# Patient Record
Sex: MALE | Race: WHITE | NOT HISPANIC OR LATINO | Employment: FULL TIME | URBAN - METROPOLITAN AREA
[De-identification: names, ages, dates, MRNs, and addresses within clinical notes are randomized per-mention and may not be internally consistent; named-entity substitution may affect disease eponyms.]

---

## 2017-08-07 ENCOUNTER — OFFICE VISIT (OUTPATIENT)
Dept: FAMILY MEDICINE | Facility: CLINIC | Age: 27
End: 2017-08-07
Payer: COMMERCIAL

## 2017-08-07 VITALS
TEMPERATURE: 98 F | WEIGHT: 184.63 LBS | HEIGHT: 68 IN | DIASTOLIC BLOOD PRESSURE: 80 MMHG | BODY MASS INDEX: 27.98 KG/M2 | SYSTOLIC BLOOD PRESSURE: 120 MMHG

## 2017-08-07 DIAGNOSIS — F90.0 ADHD, PREDOMINANTLY INATTENTIVE TYPE: Primary | ICD-10-CM

## 2017-08-07 DIAGNOSIS — Z87.442 HISTORY OF KIDNEY STONES: ICD-10-CM

## 2017-08-07 DIAGNOSIS — G47.00 INSOMNIA, UNSPECIFIED TYPE: ICD-10-CM

## 2017-08-07 PROCEDURE — 99213 OFFICE O/P EST LOW 20 MIN: CPT | Mod: S$GLB,,, | Performed by: FAMILY MEDICINE

## 2017-08-07 PROCEDURE — 3008F BODY MASS INDEX DOCD: CPT | Mod: S$GLB,,, | Performed by: FAMILY MEDICINE

## 2017-08-07 PROCEDURE — 99999 PR PBB SHADOW E&M-EST. PATIENT-LVL III: CPT | Mod: PBBFAC,,, | Performed by: FAMILY MEDICINE

## 2017-08-07 RX ORDER — LISDEXAMFETAMINE DIMESYLATE CAPSULES 20 MG/1
20 CAPSULE ORAL EVERY MORNING
Qty: 30 CAPSULE | Refills: 0 | Status: SHIPPED | OUTPATIENT
Start: 2017-08-07 | End: 2017-08-07 | Stop reason: SDUPTHER

## 2017-08-07 RX ORDER — LISDEXAMFETAMINE DIMESYLATE CAPSULES 20 MG/1
20 CAPSULE ORAL EVERY MORNING
Qty: 30 CAPSULE | Refills: 0 | Status: SHIPPED | OUTPATIENT
Start: 2017-09-07 | End: 2017-08-07 | Stop reason: SDUPTHER

## 2017-08-07 RX ORDER — LISDEXAMFETAMINE DIMESYLATE CAPSULES 20 MG/1
20 CAPSULE ORAL EVERY MORNING
Qty: 30 CAPSULE | Refills: 0 | Status: SHIPPED | OUTPATIENT
Start: 2017-10-07 | End: 2017-11-29 | Stop reason: SDUPTHER

## 2017-08-07 NOTE — PATIENT INSTRUCTIONS
Bon for insomnia.  Look for CBT-I which stands for Cognitive Behavior Therapy for Insomnia.  It was developed by the [facility]. You may find it helpful.

## 2017-08-07 NOTE — PROGRESS NOTES
"Subjective:       Patient ID: Jamshid Michaels is a 27 y.o. male.    Chief Complaint: ADHD (Would like to restart Vyvanse. Pt has not taken since Dec.)    I reviewed previous notes.  He has a history of ADHD.  Poor task completion.  More impulsive.  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  He now has a job and now has insurance and the insurance will help him pay for the Vyvanse.  No significant side effects.  He does have chronic insomnia even without the Vyvanse.      Review of Systems   Constitutional: Negative for appetite change and unexpected weight change.   Respiratory: Negative for shortness of breath.    Cardiovascular: Negative for chest pain and palpitations.   Genitourinary:        Recent history of right kidney stone   Psychiatric/Behavioral: Positive for sleep disturbance. Negative for agitation.       Objective:     Blood pressure 120/80, temperature 98 °F (36.7 °C), temperature source Oral, height 5' 8" (1.727 m), weight 83.7 kg (184 lb 10.2 oz).      Physical Exam   Constitutional: He appears well-developed and well-nourished. No distress.   Cardiovascular: Normal rate, regular rhythm and normal heart sounds.    Pulmonary/Chest: Effort normal and breath sounds normal.   Neurological: He is alert.   He does fidget and taps his feet a lot   Psychiatric: He has a normal mood and affect.       Assessment:       1. ADHD, predominantly inattentive type    2. Insomnia, unspecified type    3. History of kidney stones        Plan:       We discussed healthy lifestyle.  I referred him to the insomnia application for [employer].  I refilled 3 months of Vyvanse.     "

## 2017-08-08 ENCOUNTER — PATIENT MESSAGE (OUTPATIENT)
Dept: FAMILY MEDICINE | Facility: CLINIC | Age: 27
End: 2017-08-08

## 2017-08-09 ENCOUNTER — TELEPHONE (OUTPATIENT)
Dept: FAMILY MEDICINE | Facility: CLINIC | Age: 27
End: 2017-08-09

## 2017-08-09 NOTE — TELEPHONE ENCOUNTER
Rec'd CATY evans via fax from [facility](fax [phone]) for Vyvanse 20mg daily.    [phone]  Pt ID# [id number]    Called and spoke w/ Edwin at [facility]. Rx approved until 8/9/18.   Auth # [id number]

## 2017-11-14 ENCOUNTER — PATIENT OUTREACH (OUTPATIENT)
Dept: ADMINISTRATIVE | Facility: HOSPITAL | Age: 27
End: 2017-11-14

## 2017-11-14 NOTE — LETTER
November 27, 2017    Jamshid Michaels  [address] [place] [address]             [facility]  [address]  [place] [address]  Phone: [phone] Dear Mr. Michaels:    We have tried to reach you by mychart unsuccessfully.    [facility] is committed to your overall health.  To help you get the most out of each of your visits, we will review your information to make sure you are up to date on all of your recommended tests and/or procedures.       Dr. Abebe Lange has found that your chart shows you may be due for tetanus and flu immunizations.     If you have had any of the above done at another facility, please bring the records or information with you so that your record at [facility] will be complete.  If you would like to schedule any of these, please contact me.     If you are currently taking medication, please bring it with you to your appointment for review.      If you have any questions or concerns, please don't hesitate to call.    Thank you for letting us care for you,  Noemi Sheridan LPN Clinical Care Coordinator  [facility] [place]  [phone]

## 2017-11-29 ENCOUNTER — OFFICE VISIT (OUTPATIENT)
Dept: FAMILY MEDICINE | Facility: CLINIC | Age: 27
End: 2017-11-29
Payer: COMMERCIAL

## 2017-11-29 VITALS
BODY MASS INDEX: 28.28 KG/M2 | DIASTOLIC BLOOD PRESSURE: 98 MMHG | WEIGHT: 186.63 LBS | TEMPERATURE: 98 F | HEIGHT: 68 IN | SYSTOLIC BLOOD PRESSURE: 130 MMHG

## 2017-11-29 DIAGNOSIS — R03.0 SINGLE EPISODE OF ELEVATED BLOOD PRESSURE: ICD-10-CM

## 2017-11-29 DIAGNOSIS — F90.0 ADHD, PREDOMINANTLY INATTENTIVE TYPE: Primary | ICD-10-CM

## 2017-11-29 PROCEDURE — 99999 PR PBB SHADOW E&M-EST. PATIENT-LVL III: CPT | Mod: PBBFAC,,, | Performed by: FAMILY MEDICINE

## 2017-11-29 PROCEDURE — 99214 OFFICE O/P EST MOD 30 MIN: CPT | Mod: S$GLB,,, | Performed by: FAMILY MEDICINE

## 2017-11-29 RX ORDER — LISDEXAMFETAMINE DIMESYLATE CAPSULES 20 MG/1
20 CAPSULE ORAL EVERY MORNING
Qty: 30 CAPSULE | Refills: 0 | Status: SHIPPED | OUTPATIENT
Start: 2017-12-29 | End: 2017-11-29 | Stop reason: SDUPTHER

## 2017-11-29 RX ORDER — LISDEXAMFETAMINE DIMESYLATE CAPSULES 20 MG/1
20 CAPSULE ORAL EVERY MORNING
Qty: 30 CAPSULE | Refills: 0 | Status: SHIPPED | OUTPATIENT
Start: 2017-11-29 | End: 2017-11-29 | Stop reason: SDUPTHER

## 2017-11-29 RX ORDER — LISDEXAMFETAMINE DIMESYLATE CAPSULES 20 MG/1
20 CAPSULE ORAL EVERY MORNING
Qty: 30 CAPSULE | Refills: 0 | Status: SHIPPED | OUTPATIENT
Start: 2018-01-29 | End: 2018-03-12 | Stop reason: SDUPTHER

## 2017-11-29 NOTE — PROGRESS NOTES
"Subjective:       Patient ID: Jamshid Michaels is a 27 y.o. male.    Chief Complaint: Follow-up (3 mo med f/u. Needs flu shot)    FU ADD.  Vyvanse 20 mg lasts until 2PM which is adequate for him. He is comfortable with that dose. Better task completion and not jumping from topic to topic. Has deadline this week and stayed up late.  That may have elevated his BP. No hx of HTN but father does. Moderate alcohol this past weekend.       Review of Systems   Constitutional: Negative for fever and unexpected weight change.   Respiratory: Negative for cough and shortness of breath.    Cardiovascular: Negative for chest pain, palpitations and leg swelling.   Psychiatric/Behavioral: Negative for behavioral problems and sleep disturbance.       Objective:     Blood pressure (!) 130/98, temperature 97.9 °F (36.6 °C), temperature source Oral, height 5' 8" (1.727 m), weight 84.6 kg (186 lb 9.9 oz).      Physical Exam   Constitutional: He appears well-developed and well-nourished. No distress.   Cardiovascular: Normal rate, regular rhythm and normal heart sounds.    No murmur heard.  Pulmonary/Chest: Effort normal and breath sounds normal.   Neurological: He is alert.   Psychiatric: He has a normal mood and affect.       Assessment:       1. ADHD, predominantly inattentive type    2. Single episode of elevated blood pressure        Plan:       Monitor BP and bring log next time. Refilled Vyvanse for 3 months.      "

## 2018-03-12 ENCOUNTER — OFFICE VISIT (OUTPATIENT)
Dept: FAMILY MEDICINE | Facility: CLINIC | Age: 28
End: 2018-03-12
Payer: COMMERCIAL

## 2018-03-12 VITALS
WEIGHT: 184.75 LBS | TEMPERATURE: 98 F | DIASTOLIC BLOOD PRESSURE: 80 MMHG | HEART RATE: 60 BPM | BODY MASS INDEX: 28 KG/M2 | SYSTOLIC BLOOD PRESSURE: 110 MMHG | HEIGHT: 68 IN

## 2018-03-12 DIAGNOSIS — F90.0 ADHD, PREDOMINANTLY INATTENTIVE TYPE: Primary | ICD-10-CM

## 2018-03-12 PROCEDURE — 99999 PR PBB SHADOW E&M-EST. PATIENT-LVL III: CPT | Mod: PBBFAC,,, | Performed by: FAMILY MEDICINE

## 2018-03-12 PROCEDURE — 99213 OFFICE O/P EST LOW 20 MIN: CPT | Mod: S$GLB,,, | Performed by: FAMILY MEDICINE

## 2018-03-12 RX ORDER — LISDEXAMFETAMINE DIMESYLATE CAPSULES 20 MG/1
20 CAPSULE ORAL EVERY MORNING
Qty: 30 CAPSULE | Refills: 0 | Status: SHIPPED | OUTPATIENT
Start: 2018-03-12 | End: 2018-03-12 | Stop reason: SDUPTHER

## 2018-03-12 RX ORDER — LISDEXAMFETAMINE DIMESYLATE CAPSULES 20 MG/1
20 CAPSULE ORAL EVERY MORNING
Qty: 30 CAPSULE | Refills: 0 | Status: SHIPPED | OUTPATIENT
Start: 2018-05-12 | End: 2018-05-17 | Stop reason: SDUPTHER

## 2018-03-12 RX ORDER — LISDEXAMFETAMINE DIMESYLATE CAPSULES 20 MG/1
20 CAPSULE ORAL EVERY MORNING
Qty: 30 CAPSULE | Refills: 0 | Status: SHIPPED | OUTPATIENT
Start: 2018-04-12 | End: 2018-03-12 | Stop reason: SDUPTHER

## 2018-03-12 NOTE — PROGRESS NOTES
"Subjective:       Patient ID: Jamshid Michaels is a 27 y.o. male.    Chief Complaint: Follow-up    ADD. Functioning very well at work with Vyvanse 20. Good task completion and focus. Previously elev BP. All others ok.       Review of Systems   Constitutional: Negative for fatigue, fever and unexpected weight change.   Cardiovascular: Negative for chest pain and palpitations.   Psychiatric/Behavioral: Negative for confusion, decreased concentration and sleep disturbance.       Objective:     Blood pressure 110/80, pulse 60, temperature 97.8 °F (36.6 °C), height 5' 8" (1.727 m), weight 83.8 kg (184 lb 11.9 oz).      Physical Exam   Constitutional: He appears well-developed and well-nourished. No distress.   Cardiovascular: Normal rate, regular rhythm, normal heart sounds and intact distal pulses.    Pulmonary/Chest: Effort normal. No respiratory distress.   Musculoskeletal: He exhibits no edema.   Neurological: He is alert.   Psychiatric: He has a normal mood and affect.       Assessment:       1. ADHD, predominantly inattentive type        Plan:       3 months vyvanse sent to pharmacy     "

## 2018-05-17 ENCOUNTER — OFFICE VISIT (OUTPATIENT)
Dept: FAMILY MEDICINE | Facility: CLINIC | Age: 28
End: 2018-05-17
Payer: COMMERCIAL

## 2018-05-17 VITALS
HEIGHT: 68 IN | BODY MASS INDEX: 28.15 KG/M2 | WEIGHT: 185.75 LBS | SYSTOLIC BLOOD PRESSURE: 110 MMHG | HEART RATE: 62 BPM | DIASTOLIC BLOOD PRESSURE: 80 MMHG

## 2018-05-17 DIAGNOSIS — F90.0 ADHD, PREDOMINANTLY INATTENTIVE TYPE: Primary | ICD-10-CM

## 2018-05-17 PROCEDURE — 99999 PR PBB SHADOW E&M-EST. PATIENT-LVL III: CPT | Mod: PBBFAC,,, | Performed by: FAMILY MEDICINE

## 2018-05-17 PROCEDURE — 3008F BODY MASS INDEX DOCD: CPT | Mod: CPTII,S$GLB,, | Performed by: FAMILY MEDICINE

## 2018-05-17 PROCEDURE — 99213 OFFICE O/P EST LOW 20 MIN: CPT | Mod: S$GLB,,, | Performed by: FAMILY MEDICINE

## 2018-05-17 RX ORDER — LISDEXAMFETAMINE DIMESYLATE CAPSULES 20 MG/1
20 CAPSULE ORAL EVERY MORNING
Qty: 30 CAPSULE | Refills: 0 | Status: SHIPPED | OUTPATIENT
Start: 2018-08-11 | End: 2018-09-06 | Stop reason: SDUPTHER

## 2018-05-17 RX ORDER — LISDEXAMFETAMINE DIMESYLATE CAPSULES 20 MG/1
20 CAPSULE ORAL EVERY MORNING
Qty: 30 CAPSULE | Refills: 0 | Status: SHIPPED | OUTPATIENT
Start: 2018-07-11 | End: 2018-05-17 | Stop reason: SDUPTHER

## 2018-05-17 RX ORDER — LISDEXAMFETAMINE DIMESYLATE CAPSULES 20 MG/1
20 CAPSULE ORAL EVERY MORNING
Qty: 30 CAPSULE | Refills: 0 | Status: SHIPPED | OUTPATIENT
Start: 2018-06-11 | End: 2018-05-17 | Stop reason: SDUPTHER

## 2018-05-17 NOTE — PROGRESS NOTES
"Subjective:       Patient ID: Jamshid Michaels is a 28 y.o. male.    Chief Complaint: Follow-up (Med f/u)    FU ADD.  He works as a  and is able to complete tasks and stay focused on Vyvanse. No side effects. The pharmacy was having trouble finding his May Rx. We will call them      Review of Systems   Constitutional: Negative for appetite change and unexpected weight change.   Respiratory: Negative for shortness of breath.    Cardiovascular: Negative for chest pain and palpitations.        Elevated bp last yr was due to workout supplement that contained stimulants.    Psychiatric/Behavioral: Negative for sleep disturbance.       Objective:     Blood pressure 110/80, pulse 62, height 5' 8" (1.727 m), weight 84.2 kg (185 lb 11.8 oz).      Physical Exam   Constitutional: He appears well-developed and well-nourished. No distress.   Cardiovascular: Normal rate and regular rhythm.    Pulmonary/Chest: Effort normal and breath sounds normal.   Neurological: He is alert.       Assessment:       1. ADHD, predominantly inattentive type        Plan:       Vyvanse 20 qd. June July and August     "

## 2018-06-14 ENCOUNTER — OFFICE VISIT (OUTPATIENT)
Dept: INTERNAL MEDICINE | Facility: CLINIC | Age: 28
End: 2018-06-14
Payer: COMMERCIAL

## 2018-06-14 ENCOUNTER — LAB VISIT (OUTPATIENT)
Dept: LAB | Facility: HOSPITAL | Age: 28
End: 2018-06-14
Payer: COMMERCIAL

## 2018-06-14 VITALS
WEIGHT: 177.69 LBS | BODY MASS INDEX: 26.32 KG/M2 | SYSTOLIC BLOOD PRESSURE: 110 MMHG | OXYGEN SATURATION: 97 % | HEIGHT: 69 IN | DIASTOLIC BLOOD PRESSURE: 78 MMHG | TEMPERATURE: 98 F | HEART RATE: 106 BPM

## 2018-06-14 DIAGNOSIS — R19.7 DIARRHEA, UNSPECIFIED TYPE: ICD-10-CM

## 2018-06-14 DIAGNOSIS — E86.0 DEHYDRATION: ICD-10-CM

## 2018-06-14 DIAGNOSIS — R19.7 DIARRHEA, UNSPECIFIED TYPE: Primary | ICD-10-CM

## 2018-06-14 LAB
ALBUMIN SERPL BCP-MCNC: 3.7 G/DL
ALP SERPL-CCNC: 69 U/L
ALT SERPL W/O P-5'-P-CCNC: 23 U/L
AMYLASE SERPL-CCNC: 53 U/L
ANION GAP SERPL CALC-SCNC: 10 MMOL/L
AST SERPL-CCNC: 20 U/L
BASOPHILS # BLD AUTO: 0.02 K/UL
BASOPHILS NFR BLD: 0.2 %
BILIRUB SERPL-MCNC: 0.6 MG/DL
BILIRUB SERPL-MCNC: ABNORMAL MG/DL
BLOOD URINE, POC: ABNORMAL
BUN SERPL-MCNC: 11 MG/DL
CALCIUM SERPL-MCNC: 10 MG/DL
CHLORIDE SERPL-SCNC: 104 MMOL/L
CO2 SERPL-SCNC: 26 MMOL/L
COLOR, POC UA: YELLOW
CREAT SERPL-MCNC: 1.2 MG/DL
DIFFERENTIAL METHOD: ABNORMAL
EOSINOPHIL # BLD AUTO: 0.1 K/UL
EOSINOPHIL NFR BLD: 0.6 %
ERYTHROCYTE [DISTWIDTH] IN BLOOD BY AUTOMATED COUNT: 13.2 %
EST. GFR  (AFRICAN AMERICAN): >60 ML/MIN/1.73 M^2
EST. GFR  (NON AFRICAN AMERICAN): >60 ML/MIN/1.73 M^2
GLUCOSE SERPL-MCNC: 107 MG/DL
GLUCOSE UR QL STRIP: NORMAL
HCT VFR BLD AUTO: 43 %
HGB BLD-MCNC: 14.8 G/DL
KETONES UR QL STRIP: ABNORMAL
LEUKOCYTE ESTERASE URINE, POC: ABNORMAL
LIPASE SERPL-CCNC: 68 U/L
LYMPHOCYTES # BLD AUTO: 1.1 K/UL
LYMPHOCYTES NFR BLD: 13.3 %
MCH RBC QN AUTO: 32.2 PG
MCHC RBC AUTO-ENTMCNC: 34.4 G/DL
MCV RBC AUTO: 94 FL
MONOCYTES # BLD AUTO: 1.7 K/UL
MONOCYTES NFR BLD: 19.9 %
NEUTROPHILS # BLD AUTO: 5.5 K/UL
NEUTROPHILS NFR BLD: 65.8 %
NITRITE, POC UA: ABNORMAL
PH, POC UA: 5
PLATELET # BLD AUTO: 289 K/UL
PMV BLD AUTO: 9.4 FL
POTASSIUM SERPL-SCNC: 3.9 MMOL/L
PROT SERPL-MCNC: 7.9 G/DL
PROTEIN, POC: 30
RBC # BLD AUTO: 4.59 M/UL
SODIUM SERPL-SCNC: 140 MMOL/L
SPECIFIC GRAVITY, POC UA: 1.02
UROBILINOGEN, POC UA: NORMAL
WBC # BLD AUTO: 8.29 K/UL

## 2018-06-14 PROCEDURE — 3008F BODY MASS INDEX DOCD: CPT | Mod: CPTII,S$GLB,, | Performed by: INTERNAL MEDICINE

## 2018-06-14 PROCEDURE — S0119 ONDANSETRON 4 MG: HCPCS | Mod: S$GLB,,, | Performed by: INTERNAL MEDICINE

## 2018-06-14 PROCEDURE — 81002 URINALYSIS NONAUTO W/O SCOPE: CPT | Mod: S$GLB,,, | Performed by: INTERNAL MEDICINE

## 2018-06-14 PROCEDURE — 85025 COMPLETE CBC W/AUTO DIFF WBC: CPT

## 2018-06-14 PROCEDURE — 99999 PR PBB SHADOW E&M-EST. PATIENT-LVL III: CPT | Mod: PBBFAC,,, | Performed by: INTERNAL MEDICINE

## 2018-06-14 PROCEDURE — 36415 COLL VENOUS BLD VENIPUNCTURE: CPT

## 2018-06-14 PROCEDURE — 83690 ASSAY OF LIPASE: CPT

## 2018-06-14 PROCEDURE — 99214 OFFICE O/P EST MOD 30 MIN: CPT | Mod: 25,S$GLB,, | Performed by: INTERNAL MEDICINE

## 2018-06-14 PROCEDURE — 82150 ASSAY OF AMYLASE: CPT

## 2018-06-14 PROCEDURE — 80053 COMPREHEN METABOLIC PANEL: CPT

## 2018-06-14 RX ORDER — SODIUM CHLORIDE 9 MG/ML
INJECTION, SOLUTION INTRAVENOUS
Status: COMPLETED | OUTPATIENT
Start: 2018-06-14 | End: 2018-06-14

## 2018-06-14 RX ORDER — ONDANSETRON 8 MG/1
8 TABLET, ORALLY DISINTEGRATING ORAL EVERY 6 HOURS PRN
Qty: 10 TABLET | Refills: 0 | Status: SHIPPED | OUTPATIENT
Start: 2018-06-14 | End: 2018-09-06

## 2018-06-14 RX ORDER — ONDANSETRON 4 MG/1
8 TABLET, ORALLY DISINTEGRATING ORAL
Status: COMPLETED | OUTPATIENT
Start: 2018-06-14 | End: 2018-06-14

## 2018-06-14 RX ADMIN — ONDANSETRON 8 MG: 4 TABLET, ORALLY DISINTEGRATING ORAL at 01:06

## 2018-06-14 RX ADMIN — SODIUM CHLORIDE: 9 INJECTION, SOLUTION INTRAVENOUS at 03:06

## 2018-06-14 RX ADMIN — SODIUM CHLORIDE: 9 INJECTION, SOLUTION INTRAVENOUS at 01:06

## 2018-06-14 NOTE — PROGRESS NOTES
2 patient identifiers verified (Name and )    Fall Risk Band applied    Patient instructed not to go to restroom or stand alone.  Patient verbalized understanding.    Symptoms: Dehydration, diarrhea, vomitting    Number of attempts: 1    Size of cath used: 22    Location: Right Antecubital    Fluid ordered: 0.9NaCl    Amount of fluid given: 2000mL    Initial Vitals:   BP: 110/78   Pulse: 106   O2: 97   Temp: 98.1

## 2018-06-14 NOTE — PROGRESS NOTES
Subjective:       Patient ID: Jamshid Michaels is a 28 y.o. male.    Chief Complaint: Fever (diarrhea, NV, abd pain)    Healthy man with onset of vomiting 4 days ago.  This was brief but followed the next day darnell very frequent watery diarrhea.  He estimates 20 times per day for 2-3 days.  Diarrhea has lessened today, though still very frequent.  Has vomited several times yesterday.  Upper abd feels very bloated.  No urinary symptoms      Fever    This is a new problem. The current episode started in the past 7 days. The problem occurs daily. The problem has been unchanged. His temperature was unmeasured prior to arrival. Associated symptoms include abdominal pain, diarrhea, nausea and vomiting. Pertinent negatives include no chest pain, congestion, coughing, headaches, sore throat or wheezing. He has tried nothing for the symptoms. The treatment provided no relief.   Risk factors comment:  DRANK FROM SOMEONE ELSE'S DRINK    Review of Systems   Constitutional: Positive for fever. Negative for activity change and appetite change.   HENT: Negative for congestion, postnasal drip and sore throat.    Respiratory: Negative for cough, shortness of breath and wheezing.    Cardiovascular: Negative for chest pain and palpitations.   Gastrointestinal: Positive for abdominal pain, diarrhea, nausea and vomiting. Negative for blood in stool and constipation.   Genitourinary: Negative for decreased urine volume, difficulty urinating, flank pain and frequency.   Musculoskeletal: Negative for arthralgias.   Neurological: Negative for dizziness, weakness and headaches.       Objective:      Physical Exam   Constitutional: He is oriented to person, place, and time. He appears well-developed and well-nourished. He appears ill. No distress.   HENT:   Head: Normocephalic and atraumatic.   Right Ear: External ear normal.   Left Ear: External ear normal.   Mouth/Throat: Mucous membranes are dry.   Eyes: Conjunctivae and EOM are normal. Pupils  are equal, round, and reactive to light.   Neck: Normal range of motion. Neck supple. No thyromegaly present.   Cardiovascular: Normal rate and regular rhythm.    Pulmonary/Chest: Effort normal and breath sounds normal.   Abdominal: Soft. Bowel sounds are normal. He exhibits no mass. There is no tenderness. There is no rebound and no guarding.   Musculoskeletal: Normal range of motion.   Lymphadenopathy:     He has no cervical adenopathy.   Neurological: He is alert and oriented to person, place, and time. He has normal reflexes. He displays normal reflexes. No cranial nerve deficit. He exhibits normal muscle tone. Coordination normal.   Skin: Skin is warm and dry.       Assessment:       1. Diarrhea, unspecified type    2. Dehydration        Plan:   Jamshid was seen today for fever.    Diagnoses and all orders for this visit:    Diarrhea, unspecified type  -     POCT urine dipstick without microscope  -     CBC auto differential; Future  -     Comprehensive metabolic panel; Future  -     Amylase; Future  -     Lipase; Future    Dehydration  -     POCT urine dipstick without microscope  -     CBC auto differential; Future  -     Comprehensive metabolic panel; Future  -     Amylase; Future  -     Lipase; Future    Other orders  -     0.9%  NaCl infusion; Inject into the vein one time.    Patient feels better after 2 liters of NS

## 2018-06-14 NOTE — PROGRESS NOTES
2 patient identifiers verified (Name and )    Amount of fluid given: 2000mL    Final Vitals:   BP:111/73   Pulse:89   O2:98   Temp: 98.1     Catheter removed, tip intact.      Patient had no complaints of pain or discomfort    Instructions given to patient to continue hydrating at home     Amount of Urine Output:0    Output, report to provider:0

## 2018-09-06 ENCOUNTER — OFFICE VISIT (OUTPATIENT)
Dept: FAMILY MEDICINE | Facility: CLINIC | Age: 28
End: 2018-09-06
Payer: COMMERCIAL

## 2018-09-06 VITALS
OXYGEN SATURATION: 98 % | SYSTOLIC BLOOD PRESSURE: 122 MMHG | DIASTOLIC BLOOD PRESSURE: 86 MMHG | HEART RATE: 68 BPM | TEMPERATURE: 98 F | BODY MASS INDEX: 27.92 KG/M2 | WEIGHT: 189.06 LBS

## 2018-09-06 DIAGNOSIS — F90.0 ADHD, PREDOMINANTLY INATTENTIVE TYPE: Primary | ICD-10-CM

## 2018-09-06 PROCEDURE — 99213 OFFICE O/P EST LOW 20 MIN: CPT | Mod: S$GLB,,, | Performed by: FAMILY MEDICINE

## 2018-09-06 PROCEDURE — 3008F BODY MASS INDEX DOCD: CPT | Mod: CPTII,S$GLB,, | Performed by: FAMILY MEDICINE

## 2018-09-06 PROCEDURE — 99999 PR PBB SHADOW E&M-EST. PATIENT-LVL III: CPT | Mod: PBBFAC,,, | Performed by: FAMILY MEDICINE

## 2018-09-06 RX ORDER — LISDEXAMFETAMINE DIMESYLATE CAPSULES 20 MG/1
20 CAPSULE ORAL EVERY MORNING
Qty: 30 CAPSULE | Refills: 0 | Status: SHIPPED | OUTPATIENT
Start: 2018-09-06 | End: 2018-09-06 | Stop reason: SDUPTHER

## 2018-09-06 RX ORDER — LISDEXAMFETAMINE DIMESYLATE CAPSULES 20 MG/1
20 CAPSULE ORAL EVERY MORNING
Qty: 30 CAPSULE | Refills: 0 | Status: SHIPPED | OUTPATIENT
Start: 2018-10-06 | End: 2018-09-06 | Stop reason: SDUPTHER

## 2018-09-06 RX ORDER — LISDEXAMFETAMINE DIMESYLATE CAPSULES 20 MG/1
20 CAPSULE ORAL EVERY MORNING
Qty: 30 CAPSULE | Refills: 0 | Status: SHIPPED | OUTPATIENT
Start: 2018-11-06 | End: 2018-12-27 | Stop reason: SDUPTHER

## 2018-09-06 RX ORDER — LISDEXAMFETAMINE DIMESYLATE CAPSULES 20 MG/1
CAPSULE ORAL
COMMUNITY
Start: 2018-08-29 | End: 2018-09-06 | Stop reason: CLARIF

## 2018-09-06 NOTE — PROGRESS NOTES
Subjective:       Patient ID: Jamshid Michaels is a 28 y.o. male.    Chief Complaint: Medication Refill    FU ADD.  Vyvanse helps him stay on task at work. It wears off around 2:30 but he gets by. There was a problem with him getting a refill at the end of August because his prescription had been written more than 90 days prior.  The prescriptions were little bit out of sync in he does not always get them filled every 30 days.  He is not having any significant side effects.  He sometimes has a little trouble sleeping because he has consume some coffee in the late afternoon.  Overall his medication is effective for him.      Review of Systems   Constitutional: Negative for fever and unexpected weight change.   Respiratory: Negative for shortness of breath.    Cardiovascular: Negative for chest pain and palpitations.   Genitourinary:        Past history of kidney stones.  None recently.  There is a family history of kidney stones.  Grandparents.       Objective:     Blood pressure 122/86, pulse 68, temperature 97.8 °F (36.6 °C), temperature source Oral, weight 85.7 kg (189 lb 0.7 oz), SpO2 98 %.      Physical Exam   Constitutional: He appears well-developed and well-nourished. No distress.   Cardiovascular: Normal rate, regular rhythm and normal heart sounds.   Pulmonary/Chest: Effort normal and breath sounds normal.   Neurological: He is alert.       Assessment:       1. ADHD, predominantly inattentive type        Plan:         I refilled 3 months of Vyvanse.  We talked about filling them at the appropriate time.  Follow-up in 3 months.

## 2018-12-27 ENCOUNTER — OFFICE VISIT (OUTPATIENT)
Dept: FAMILY MEDICINE | Facility: CLINIC | Age: 28
End: 2018-12-27
Payer: COMMERCIAL

## 2018-12-27 VITALS
SYSTOLIC BLOOD PRESSURE: 110 MMHG | BODY MASS INDEX: 26.92 KG/M2 | HEART RATE: 71 BPM | DIASTOLIC BLOOD PRESSURE: 70 MMHG | WEIGHT: 181.75 LBS | TEMPERATURE: 98 F | RESPIRATION RATE: 20 BRPM | HEIGHT: 69 IN | OXYGEN SATURATION: 98 %

## 2018-12-27 DIAGNOSIS — F90.0 ADHD, PREDOMINANTLY INATTENTIVE TYPE: Primary | ICD-10-CM

## 2018-12-27 PROCEDURE — 99213 OFFICE O/P EST LOW 20 MIN: CPT | Mod: S$GLB,,, | Performed by: FAMILY MEDICINE

## 2018-12-27 PROCEDURE — 3008F BODY MASS INDEX DOCD: CPT | Mod: CPTII,S$GLB,, | Performed by: FAMILY MEDICINE

## 2018-12-27 PROCEDURE — 99999 PR PBB SHADOW E&M-EST. PATIENT-LVL III: CPT | Mod: PBBFAC,,, | Performed by: FAMILY MEDICINE

## 2018-12-27 RX ORDER — LISDEXAMFETAMINE DIMESYLATE CAPSULES 20 MG/1
20 CAPSULE ORAL EVERY MORNING
Qty: 30 CAPSULE | Refills: 0 | Status: SHIPPED | OUTPATIENT
Start: 2019-01-27 | End: 2018-12-27 | Stop reason: SDUPTHER

## 2018-12-27 RX ORDER — LISDEXAMFETAMINE DIMESYLATE CAPSULES 20 MG/1
20 CAPSULE ORAL EVERY MORNING
Qty: 30 CAPSULE | Refills: 0 | Status: SHIPPED | OUTPATIENT
Start: 2019-02-27 | End: 2019-04-17 | Stop reason: SDUPTHER

## 2018-12-27 RX ORDER — LISDEXAMFETAMINE DIMESYLATE CAPSULES 20 MG/1
20 CAPSULE ORAL EVERY MORNING
Qty: 30 CAPSULE | Refills: 0 | Status: SHIPPED | OUTPATIENT
Start: 2018-12-27 | End: 2018-12-27 | Stop reason: SDUPTHER

## 2018-12-27 NOTE — PROGRESS NOTES
"Subjective:       Patient ID: Jamshid Michaels is a 28 y.o. male.    Chief Complaint: Medication Refill    Follow-up ADD.  He takes Vyvanse 20 mg.  That is sufficient to help him stay focused at work.  He has his  exam scheduled for April.  No chest pain dyspnea or sleep disturbance.  No personality change.      Review of Systems   Constitutional: Negative for fever and unexpected weight change.   Respiratory: Negative for shortness of breath.    Cardiovascular: Negative for chest pain and palpitations.       Objective:     Blood pressure 110/70, pulse 71, temperature 97.8 °F (36.6 °C), temperature source Oral, resp. rate 20, height 5' 9" (1.753 m), weight 82.5 kg (181 lb 12.3 oz), SpO2 98 %.      Physical Exam   Constitutional: He appears well-developed and well-nourished. No distress.   Cardiovascular: Normal rate and regular rhythm.   No murmur heard.  Pulmonary/Chest: Effort normal and breath sounds normal.   Neurological: He is alert.       Assessment:       1. ADHD, predominantly inattentive type        Plan:       I refilled 3 months of Vyvanse.      "

## 2019-04-17 ENCOUNTER — OFFICE VISIT (OUTPATIENT)
Dept: FAMILY MEDICINE | Facility: CLINIC | Age: 29
End: 2019-04-17
Payer: COMMERCIAL

## 2019-04-17 VITALS
HEART RATE: 70 BPM | WEIGHT: 180.31 LBS | BODY MASS INDEX: 26.71 KG/M2 | SYSTOLIC BLOOD PRESSURE: 120 MMHG | TEMPERATURE: 98 F | HEIGHT: 69 IN | DIASTOLIC BLOOD PRESSURE: 70 MMHG

## 2019-04-17 DIAGNOSIS — F90.0 ADHD, PREDOMINANTLY INATTENTIVE TYPE: Primary | ICD-10-CM

## 2019-04-17 PROCEDURE — 3008F PR BODY MASS INDEX (BMI) DOCUMENTED: ICD-10-PCS | Mod: CPTII,S$GLB,, | Performed by: FAMILY MEDICINE

## 2019-04-17 PROCEDURE — 99213 OFFICE O/P EST LOW 20 MIN: CPT | Mod: S$GLB,,, | Performed by: FAMILY MEDICINE

## 2019-04-17 PROCEDURE — 99999 PR PBB SHADOW E&M-EST. PATIENT-LVL III: CPT | Mod: PBBFAC,,, | Performed by: FAMILY MEDICINE

## 2019-04-17 PROCEDURE — 99213 PR OFFICE/OUTPT VISIT, EST, LEVL III, 20-29 MIN: ICD-10-PCS | Mod: S$GLB,,, | Performed by: FAMILY MEDICINE

## 2019-04-17 PROCEDURE — 99999 PR PBB SHADOW E&M-EST. PATIENT-LVL III: ICD-10-PCS | Mod: PBBFAC,,, | Performed by: FAMILY MEDICINE

## 2019-04-17 PROCEDURE — 3008F BODY MASS INDEX DOCD: CPT | Mod: CPTII,S$GLB,, | Performed by: FAMILY MEDICINE

## 2019-04-17 RX ORDER — LISDEXAMFETAMINE DIMESYLATE CAPSULES 20 MG/1
20 CAPSULE ORAL EVERY MORNING
Qty: 30 CAPSULE | Refills: 0 | Status: SHIPPED | OUTPATIENT
Start: 2019-04-17 | End: 2019-04-17 | Stop reason: SDUPTHER

## 2019-04-17 RX ORDER — LISDEXAMFETAMINE DIMESYLATE CAPSULES 20 MG/1
20 CAPSULE ORAL EVERY MORNING
Qty: 30 CAPSULE | Refills: 0 | Status: SHIPPED | OUTPATIENT
Start: 2019-05-17 | End: 2019-04-17 | Stop reason: SDUPTHER

## 2019-04-17 RX ORDER — LISDEXAMFETAMINE DIMESYLATE CAPSULES 20 MG/1
20 CAPSULE ORAL EVERY MORNING
Qty: 30 CAPSULE | Refills: 0 | Status: SHIPPED | OUTPATIENT
Start: 2019-06-17 | End: 2019-07-29 | Stop reason: SDUPTHER

## 2019-04-17 NOTE — PROGRESS NOTES
"Subjective:       Patient ID: Jamshid Michaels is a 29 y.o. male.    Chief Complaint: Follow-up (Med refill.)    Follow-up ADD.  He is functional at work.  He feels that his Vyvanse last 4-5 hours but that seems to be sufficient for him.  He mostly uses it on work days but later this year he will be doing studying on weekends.  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  No chest pain or palpitations.  He sleeps well.    Review of Systems   Constitutional: Negative for fever and unexpected weight change.   Respiratory: Negative for chest tightness.    Cardiovascular: Negative for palpitations.       Objective:     Blood pressure 120/70, pulse 70, temperature 97.7 °F (36.5 °C), temperature source Oral, height 5' 9" (1.753 m), weight 81.8 kg (180 lb 5.4 oz).      Physical Exam   Constitutional: He appears well-developed and well-nourished. No distress.   Cardiovascular: Normal rate and regular rhythm.   Pulmonary/Chest: Effort normal and breath sounds normal.       Assessment:       1. ADHD, predominantly inattentive type        Plan:       Refilled 3 months of Vyvanse.  Follow-up in July.      "

## 2019-07-29 ENCOUNTER — OFFICE VISIT (OUTPATIENT)
Dept: FAMILY MEDICINE | Facility: CLINIC | Age: 29
End: 2019-07-29
Payer: COMMERCIAL

## 2019-07-29 VITALS
RESPIRATION RATE: 18 BRPM | SYSTOLIC BLOOD PRESSURE: 118 MMHG | HEART RATE: 52 BPM | DIASTOLIC BLOOD PRESSURE: 78 MMHG | BODY MASS INDEX: 25.8 KG/M2 | WEIGHT: 174.69 LBS

## 2019-07-29 DIAGNOSIS — F90.0 ADHD, PREDOMINANTLY INATTENTIVE TYPE: Primary | ICD-10-CM

## 2019-07-29 PROCEDURE — 99213 OFFICE O/P EST LOW 20 MIN: CPT | Mod: S$GLB,,, | Performed by: FAMILY MEDICINE

## 2019-07-29 PROCEDURE — 3008F PR BODY MASS INDEX (BMI) DOCUMENTED: ICD-10-PCS | Mod: CPTII,S$GLB,, | Performed by: FAMILY MEDICINE

## 2019-07-29 PROCEDURE — 99999 PR PBB SHADOW E&M-EST. PATIENT-LVL III: ICD-10-PCS | Mod: PBBFAC,,, | Performed by: FAMILY MEDICINE

## 2019-07-29 PROCEDURE — 99213 PR OFFICE/OUTPT VISIT, EST, LEVL III, 20-29 MIN: ICD-10-PCS | Mod: S$GLB,,, | Performed by: FAMILY MEDICINE

## 2019-07-29 PROCEDURE — 3008F BODY MASS INDEX DOCD: CPT | Mod: CPTII,S$GLB,, | Performed by: FAMILY MEDICINE

## 2019-07-29 PROCEDURE — 99999 PR PBB SHADOW E&M-EST. PATIENT-LVL III: CPT | Mod: PBBFAC,,, | Performed by: FAMILY MEDICINE

## 2019-07-29 RX ORDER — LISDEXAMFETAMINE DIMESYLATE CAPSULES 20 MG/1
20 CAPSULE ORAL EVERY MORNING
Qty: 30 CAPSULE | Refills: 0 | Status: SHIPPED | OUTPATIENT
Start: 2019-09-29 | End: 2019-11-13 | Stop reason: SDUPTHER

## 2019-07-29 RX ORDER — LISDEXAMFETAMINE DIMESYLATE CAPSULES 20 MG/1
20 CAPSULE ORAL EVERY MORNING
Qty: 30 CAPSULE | Refills: 0 | Status: SHIPPED | OUTPATIENT
Start: 2019-08-29 | End: 2019-07-29 | Stop reason: SDUPTHER

## 2019-07-29 RX ORDER — LISDEXAMFETAMINE DIMESYLATE CAPSULES 20 MG/1
20 CAPSULE ORAL EVERY MORNING
Qty: 30 CAPSULE | Refills: 0 | Status: SHIPPED | OUTPATIENT
Start: 2019-07-29 | End: 2019-07-29 | Stop reason: SDUPTHER

## 2019-07-29 NOTE — PROGRESS NOTES
Subjective:       Patient ID: Jamshid Michaels is a 29 y.o. male.    Chief Complaint: No chief complaint on file.    30yo male presenting to clinic for medication refill of Vyvanse. Pt reports he has been doing well with current 20mg day dosage that he has taken for past 2yrs. 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 Pt reports no palpitations, no SOB, no chest pain. Pt reports consistent energy levels. Pt has continued moderate weight loss with medication but is also has been recently training for a number of races including triathlons and most recently a marathon.    Pt has no further complaints.    Review of Systems   Constitutional: Positive for activity change (Training for marathon). Negative for chills, diaphoresis, fatigue and fever.   HENT: Negative for ear pain and sore throat.    Respiratory: Negative for cough and shortness of breath.    Cardiovascular: Negative for chest pain, palpitations and leg swelling.   Gastrointestinal: Negative for abdominal distention, abdominal pain, constipation and diarrhea.   Genitourinary: Negative for dysuria and urgency.   Skin: Negative for color change and pallor.   Neurological: Positive for headaches (Ongoing, stable, nondistressing).       Objective:     Blood pressure 118/78, pulse (!) 52, resp. rate 18, weight 79.2 kg (174 lb 11.4 oz).      Physical Exam   Constitutional: He is oriented to person, place, and time. He appears well-developed and well-nourished. No distress.   HENT:   Head: Normocephalic.   Nose:       Mouth/Throat: Oropharynx is clear and moist.   Eyes: EOM are normal. Right eye exhibits no discharge. Left eye exhibits no discharge. No scleral icterus.   Neck: Normal range of motion.   Cardiovascular: Normal rate and normal heart sounds.   Slight bradycardia, nonsymptomatic   Pulmonary/Chest: Effort normal and breath sounds normal. No respiratory  distress. He has no wheezes.   Abdominal: Soft. He exhibits no distension. There is no tenderness.   Musculoskeletal: He exhibits no edema.   Neurological: He is alert and oriented to person, place, and time.   Skin: Skin is warm and dry. He is not diaphoretic. No erythema.   Psychiatric: He has a normal mood and affect. His behavior is normal.   Vitals reviewed.      Assessment:       1. ADHD, predominantly inattentive type        Plan:       ADHD, prescription refill  -No current problems with medication side effects  -Pt feels medication continues to benefit him  -Increased stress with upcoming test but managed adequately  -Vyvanse prescription was refilled

## 2019-11-13 ENCOUNTER — OFFICE VISIT (OUTPATIENT)
Dept: FAMILY MEDICINE | Facility: CLINIC | Age: 29
End: 2019-11-13
Payer: COMMERCIAL

## 2019-11-13 VITALS
SYSTOLIC BLOOD PRESSURE: 106 MMHG | HEIGHT: 69 IN | TEMPERATURE: 99 F | WEIGHT: 176.5 LBS | DIASTOLIC BLOOD PRESSURE: 60 MMHG | HEART RATE: 66 BPM | BODY MASS INDEX: 26.14 KG/M2

## 2019-11-13 DIAGNOSIS — F90.0 ADHD, PREDOMINANTLY INATTENTIVE TYPE: Primary | ICD-10-CM

## 2019-11-13 PROCEDURE — 3008F BODY MASS INDEX DOCD: CPT | Mod: CPTII,S$GLB,, | Performed by: FAMILY MEDICINE

## 2019-11-13 PROCEDURE — 99213 PR OFFICE/OUTPT VISIT, EST, LEVL III, 20-29 MIN: ICD-10-PCS | Mod: S$GLB,,, | Performed by: FAMILY MEDICINE

## 2019-11-13 PROCEDURE — 99213 OFFICE O/P EST LOW 20 MIN: CPT | Mod: S$GLB,,, | Performed by: FAMILY MEDICINE

## 2019-11-13 PROCEDURE — 99999 PR PBB SHADOW E&M-EST. PATIENT-LVL III: CPT | Mod: PBBFAC,,, | Performed by: FAMILY MEDICINE

## 2019-11-13 PROCEDURE — 99999 PR PBB SHADOW E&M-EST. PATIENT-LVL III: ICD-10-PCS | Mod: PBBFAC,,, | Performed by: FAMILY MEDICINE

## 2019-11-13 PROCEDURE — 3008F PR BODY MASS INDEX (BMI) DOCUMENTED: ICD-10-PCS | Mod: CPTII,S$GLB,, | Performed by: FAMILY MEDICINE

## 2019-11-13 RX ORDER — LISDEXAMFETAMINE DIMESYLATE CAPSULES 20 MG/1
20 CAPSULE ORAL EVERY MORNING
Qty: 30 CAPSULE | Refills: 0 | Status: SHIPPED | OUTPATIENT
Start: 2019-12-13 | End: 2019-11-13 | Stop reason: SDUPTHER

## 2019-11-13 RX ORDER — LISDEXAMFETAMINE DIMESYLATE CAPSULES 20 MG/1
20 CAPSULE ORAL EVERY MORNING
Qty: 30 CAPSULE | Refills: 0 | Status: SHIPPED | OUTPATIENT
Start: 2019-11-13 | End: 2019-11-13 | Stop reason: SDUPTHER

## 2019-11-13 RX ORDER — LISDEXAMFETAMINE DIMESYLATE CAPSULES 20 MG/1
20 CAPSULE ORAL EVERY MORNING
Qty: 30 CAPSULE | Refills: 0 | Status: SHIPPED | OUTPATIENT
Start: 2020-01-13 | End: 2020-03-09 | Stop reason: SDUPTHER

## 2019-11-13 NOTE — PROGRESS NOTES
"Subjective:       Patient ID: Jamshid Michaels is a 29 y.o. male.    Chief Complaint: Follow-up (3 mo med f/u)    Follow-up ADD.  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  He is doing a variety of tasks it is firm and stays focused.  He has been taking Vyvanse 20 mg with good results and no side effects.  He is a regular runner and is training for marathon.    Review of Systems   Constitutional: Negative for fever and unexpected weight change.   Respiratory: Negative for chest tightness and shortness of breath.    Cardiovascular: Negative for chest pain and palpitations.       Objective:     Blood pressure 106/60, pulse 66, temperature 98.6 °F (37 °C), temperature source Oral, height 5' 9" (1.753 m), weight 80 kg (176 lb 7.7 oz).      Physical Exam   Constitutional: He appears well-developed and well-nourished. No distress.   Cardiovascular: Normal rate and regular rhythm.   Pulmonary/Chest: Effort normal and breath sounds normal.   Neurological: He is alert.       Assessment:       1. ADHD, predominantly inattentive type        Plan:       I refilled 3 months of Vyvanse.      "

## 2020-03-09 ENCOUNTER — OFFICE VISIT (OUTPATIENT)
Dept: FAMILY MEDICINE | Facility: CLINIC | Age: 30
End: 2020-03-09

## 2020-03-09 VITALS
DIASTOLIC BLOOD PRESSURE: 86 MMHG | TEMPERATURE: 98 F | HEART RATE: 63 BPM | WEIGHT: 182.88 LBS | BODY MASS INDEX: 27.09 KG/M2 | HEIGHT: 69 IN | SYSTOLIC BLOOD PRESSURE: 112 MMHG | OXYGEN SATURATION: 97 %

## 2020-03-09 DIAGNOSIS — F90.0 ADHD, PREDOMINANTLY INATTENTIVE TYPE: Primary | ICD-10-CM

## 2020-03-09 PROCEDURE — 99999 PR PBB SHADOW E&M-EST. PATIENT-LVL III: CPT | Mod: PBBFAC,,, | Performed by: FAMILY MEDICINE

## 2020-03-09 PROCEDURE — 99213 OFFICE O/P EST LOW 20 MIN: CPT | Mod: S$PBB,,, | Performed by: FAMILY MEDICINE

## 2020-03-09 PROCEDURE — 99213 PR OFFICE/OUTPT VISIT, EST, LEVL III, 20-29 MIN: ICD-10-PCS | Mod: S$PBB,,, | Performed by: FAMILY MEDICINE

## 2020-03-09 PROCEDURE — 99999 PR PBB SHADOW E&M-EST. PATIENT-LVL III: ICD-10-PCS | Mod: PBBFAC,,, | Performed by: FAMILY MEDICINE

## 2020-03-09 PROCEDURE — 99213 OFFICE O/P EST LOW 20 MIN: CPT | Mod: PBBFAC,PN | Performed by: FAMILY MEDICINE

## 2020-03-09 RX ORDER — LISDEXAMFETAMINE DIMESYLATE CAPSULES 20 MG/1
20 CAPSULE ORAL EVERY MORNING
Qty: 30 CAPSULE | Refills: 0 | Status: SHIPPED | OUTPATIENT
Start: 2020-04-09 | End: 2020-03-09 | Stop reason: SDUPTHER

## 2020-03-09 RX ORDER — LISDEXAMFETAMINE DIMESYLATE CAPSULES 20 MG/1
20 CAPSULE ORAL EVERY MORNING
Qty: 30 CAPSULE | Refills: 0 | Status: SHIPPED | OUTPATIENT
Start: 2020-03-09 | End: 2020-03-09 | Stop reason: SDUPTHER

## 2020-03-09 RX ORDER — LISDEXAMFETAMINE DIMESYLATE CAPSULES 20 MG/1
20 CAPSULE ORAL EVERY MORNING
Qty: 30 CAPSULE | Refills: 0 | Status: SHIPPED | OUTPATIENT
Start: 2020-05-09 | End: 2020-06-23 | Stop reason: SDUPTHER

## 2020-03-09 NOTE — PROGRESS NOTES
"Subjective:       Patient ID: Jamshid Michaels is a 29 y.o. male.    Chief Complaint: Medication Refill    Follow-up ADD.  He is much better organized and efficient on Vyvanse 20 mg.  No side effects.  He is physically active.  He sleeps well.  He has a new job with new insurance.    Review of Systems   Constitutional: Negative for fever and unexpected weight change.   Respiratory: Negative for shortness of breath.    Cardiovascular: Negative for chest pain and palpitations.       Objective:     Blood pressure 112/86, pulse 63, temperature 97.8 °F (36.6 °C), temperature source Oral, height 5' 9" (1.753 m), weight 83 kg (182 lb 14.3 oz), SpO2 97 %.      Physical Exam   Constitutional: He appears well-developed and well-nourished. No distress.   Cardiovascular: Normal rate and regular rhythm.   Pulmonary/Chest: Effort normal and breath sounds normal.       Assessment:       1. ADHD, predominantly inattentive type        Plan:       I refilled 3 months of Vyvanse 20 mg.  Follow-up in 3 months.  Continue with healthy lifestyle.      "

## 2020-06-23 ENCOUNTER — OFFICE VISIT (OUTPATIENT)
Dept: FAMILY MEDICINE | Facility: CLINIC | Age: 30
End: 2020-06-23
Payer: COMMERCIAL

## 2020-06-23 VITALS
BODY MASS INDEX: 27.35 KG/M2 | SYSTOLIC BLOOD PRESSURE: 118 MMHG | HEART RATE: 64 BPM | WEIGHT: 180.44 LBS | TEMPERATURE: 98 F | DIASTOLIC BLOOD PRESSURE: 68 MMHG | HEIGHT: 68 IN

## 2020-06-23 DIAGNOSIS — F90.0 ADHD, PREDOMINANTLY INATTENTIVE TYPE: Primary | ICD-10-CM

## 2020-06-23 PROCEDURE — 99999 PR PBB SHADOW E&M-EST. PATIENT-LVL III: ICD-10-PCS | Mod: PBBFAC,,, | Performed by: FAMILY MEDICINE

## 2020-06-23 PROCEDURE — 99213 OFFICE O/P EST LOW 20 MIN: CPT | Mod: S$GLB,,, | Performed by: FAMILY MEDICINE

## 2020-06-23 PROCEDURE — 3008F PR BODY MASS INDEX (BMI) DOCUMENTED: ICD-10-PCS | Mod: CPTII,S$GLB,, | Performed by: FAMILY MEDICINE

## 2020-06-23 PROCEDURE — 99999 PR PBB SHADOW E&M-EST. PATIENT-LVL III: CPT | Mod: PBBFAC,,, | Performed by: FAMILY MEDICINE

## 2020-06-23 PROCEDURE — 3008F BODY MASS INDEX DOCD: CPT | Mod: CPTII,S$GLB,, | Performed by: FAMILY MEDICINE

## 2020-06-23 PROCEDURE — 99213 PR OFFICE/OUTPT VISIT, EST, LEVL III, 20-29 MIN: ICD-10-PCS | Mod: S$GLB,,, | Performed by: FAMILY MEDICINE

## 2020-06-23 RX ORDER — LISDEXAMFETAMINE DIMESYLATE CAPSULES 20 MG/1
20 CAPSULE ORAL EVERY MORNING
Qty: 30 CAPSULE | Refills: 0 | Status: SHIPPED | OUTPATIENT
Start: 2020-08-23 | End: 2020-10-05 | Stop reason: SDUPTHER

## 2020-06-23 RX ORDER — LISDEXAMFETAMINE DIMESYLATE CAPSULES 20 MG/1
20 CAPSULE ORAL EVERY MORNING
Qty: 30 CAPSULE | Refills: 0 | Status: SHIPPED | OUTPATIENT
Start: 2020-06-23 | End: 2020-10-05 | Stop reason: SDUPTHER

## 2020-06-23 RX ORDER — LISDEXAMFETAMINE DIMESYLATE CAPSULES 20 MG/1
20 CAPSULE ORAL EVERY MORNING
Qty: 30 CAPSULE | Refills: 0 | Status: SHIPPED | OUTPATIENT
Start: 2020-07-23 | End: 2020-10-05 | Stop reason: SDUPTHER

## 2020-06-23 NOTE — PROGRESS NOTES
"Subjective:       Patient ID: Jamshid Michaels is a 30 y.o. male.    Chief Complaint: ADHD (med refill)    Follow-up ADD.  He works as an  and stays focused and has good task completion on Vyvanse 20 mg.  No significant side effects.  No trouble sleeping.  He exercises regularly.    Review of Systems   Constitutional: Negative for fatigue and unexpected weight change.   Respiratory: Negative for shortness of breath.    Cardiovascular: Negative for chest pain.         Objective:     Blood pressure 118/68, pulse 64, temperature 98.1 °F (36.7 °C), temperature source Temporal, height 5' 8" (1.727 m), weight 81.8 kg (180 lb 7.1 oz).      Physical Exam  Constitutional:       General: He is not in acute distress.     Appearance: Normal appearance. He is normal weight.   Cardiovascular:      Rate and Rhythm: Normal rate and regular rhythm.   Pulmonary:      Effort: Pulmonary effort is normal.      Breath sounds: Normal breath sounds.         Assessment:       1. ADHD, predominantly inattentive type        Plan:       I refilled 3 months of Vyvanse.  He could do a video visit next time if he wishes.        "

## 2020-10-05 ENCOUNTER — OFFICE VISIT (OUTPATIENT)
Dept: FAMILY MEDICINE | Facility: CLINIC | Age: 30
End: 2020-10-05
Payer: COMMERCIAL

## 2020-10-05 DIAGNOSIS — F90.0 ADHD, PREDOMINANTLY INATTENTIVE TYPE: Primary | ICD-10-CM

## 2020-10-05 PROCEDURE — 99213 PR OFFICE/OUTPT VISIT, EST, LEVL III, 20-29 MIN: ICD-10-PCS | Mod: 95,,, | Performed by: FAMILY MEDICINE

## 2020-10-05 PROCEDURE — 99213 OFFICE O/P EST LOW 20 MIN: CPT | Mod: 95,,, | Performed by: FAMILY MEDICINE

## 2020-10-05 RX ORDER — LISDEXAMFETAMINE DIMESYLATE CAPSULES 20 MG/1
20 CAPSULE ORAL EVERY MORNING
Qty: 30 CAPSULE | Refills: 0 | Status: SHIPPED | OUTPATIENT
Start: 2020-11-07 | End: 2021-02-02 | Stop reason: SDUPTHER

## 2020-10-05 RX ORDER — LISDEXAMFETAMINE DIMESYLATE CAPSULES 20 MG/1
20 CAPSULE ORAL EVERY MORNING
Qty: 30 CAPSULE | Refills: 0 | Status: SHIPPED | OUTPATIENT
Start: 2020-12-07 | End: 2021-02-02 | Stop reason: SDUPTHER

## 2020-10-05 RX ORDER — LISDEXAMFETAMINE DIMESYLATE CAPSULES 20 MG/1
20 CAPSULE ORAL EVERY MORNING
Qty: 30 CAPSULE | Refills: 0 | Status: SHIPPED | OUTPATIENT
Start: 2020-10-08 | End: 2021-02-02 | Stop reason: SDUPTHER

## 2020-10-07 NOTE — PROGRESS NOTES
Subjective:       Patient ID: Jamshid Michaels is a 30 y.o. male.    Chief Complaint: ADD  He is doing well on 20 mg vyvanse.  He is completing his tasks at work. No trouble with sleep or palpitation. Weight is stable.     Review of Systems   Constitutional: Negative for activity change and unexpected weight change.   HENT: Negative for hearing loss, rhinorrhea and trouble swallowing.    Eyes: Negative for discharge and visual disturbance.   Respiratory: Negative for chest tightness and wheezing.    Cardiovascular: Negative for chest pain and palpitations.   Gastrointestinal: Negative for blood in stool, constipation, diarrhea and vomiting.   Endocrine: Negative for polydipsia and polyuria.   Genitourinary: Negative for difficulty urinating, hematuria and urgency.   Musculoskeletal: Negative for arthralgias, joint swelling and neck pain.   Neurological: Negative for weakness and headaches.   Psychiatric/Behavioral: Negative for confusion and dysphoric mood.         Objective:      Physical Exam  Constitutional:       General: He is not in acute distress.  Pulmonary:      Effort: No respiratory distress.   Neurological:      Mental Status: He is alert.   Psychiatric:         Mood and Affect: Mood normal.         Assessment:       1. ADHD, predominantly inattentive type        Plan:       I refilled vyvanse for 3 months. RTC 3 months

## 2021-02-02 ENCOUNTER — LAB VISIT (OUTPATIENT)
Dept: LAB | Facility: HOSPITAL | Age: 31
End: 2021-02-02
Attending: FAMILY MEDICINE
Payer: COMMERCIAL

## 2021-02-02 ENCOUNTER — OFFICE VISIT (OUTPATIENT)
Dept: FAMILY MEDICINE | Facility: CLINIC | Age: 31
End: 2021-02-02
Payer: COMMERCIAL

## 2021-02-02 VITALS
DIASTOLIC BLOOD PRESSURE: 80 MMHG | SYSTOLIC BLOOD PRESSURE: 120 MMHG | WEIGHT: 186.81 LBS | BODY MASS INDEX: 28.31 KG/M2 | TEMPERATURE: 98 F | HEIGHT: 68 IN | HEART RATE: 68 BPM

## 2021-02-02 DIAGNOSIS — F90.0 ADHD, PREDOMINANTLY INATTENTIVE TYPE: Primary | ICD-10-CM

## 2021-02-02 DIAGNOSIS — Z00.00 HEALTH MAINTENANCE EXAMINATION: ICD-10-CM

## 2021-02-02 PROCEDURE — 3008F BODY MASS INDEX DOCD: CPT | Mod: CPTII,S$GLB,, | Performed by: FAMILY MEDICINE

## 2021-02-02 PROCEDURE — 99213 PR OFFICE/OUTPT VISIT, EST, LEVL III, 20-29 MIN: ICD-10-PCS | Mod: S$GLB,,, | Performed by: FAMILY MEDICINE

## 2021-02-02 PROCEDURE — 80048 BASIC METABOLIC PNL TOTAL CA: CPT

## 2021-02-02 PROCEDURE — 3008F PR BODY MASS INDEX (BMI) DOCUMENTED: ICD-10-PCS | Mod: CPTII,S$GLB,, | Performed by: FAMILY MEDICINE

## 2021-02-02 PROCEDURE — 80061 LIPID PANEL: CPT

## 2021-02-02 PROCEDURE — 36415 COLL VENOUS BLD VENIPUNCTURE: CPT | Mod: PN

## 2021-02-02 PROCEDURE — 99213 OFFICE O/P EST LOW 20 MIN: CPT | Mod: S$GLB,,, | Performed by: FAMILY MEDICINE

## 2021-02-02 RX ORDER — LISDEXAMFETAMINE DIMESYLATE CAPSULES 20 MG/1
20 CAPSULE ORAL EVERY MORNING
Qty: 30 CAPSULE | Refills: 0 | Status: SHIPPED | OUTPATIENT
Start: 2021-03-02 | End: 2021-04-30 | Stop reason: SDUPTHER

## 2021-02-02 RX ORDER — LISDEXAMFETAMINE DIMESYLATE CAPSULES 20 MG/1
20 CAPSULE ORAL EVERY MORNING
Qty: 30 CAPSULE | Refills: 0 | Status: SHIPPED | OUTPATIENT
Start: 2021-02-02 | End: 2021-06-29 | Stop reason: SDUPTHER

## 2021-02-02 RX ORDER — LISDEXAMFETAMINE DIMESYLATE CAPSULES 20 MG/1
20 CAPSULE ORAL EVERY MORNING
Qty: 30 CAPSULE | Refills: 0 | Status: SHIPPED | OUTPATIENT
Start: 2021-02-02 | End: 2021-02-02 | Stop reason: SDUPTHER

## 2021-02-02 RX ORDER — LISDEXAMFETAMINE DIMESYLATE CAPSULES 20 MG/1
20 CAPSULE ORAL EVERY MORNING
Qty: 30 CAPSULE | Refills: 0 | Status: SHIPPED | OUTPATIENT
Start: 2021-04-02 | End: 2021-06-29 | Stop reason: SDUPTHER

## 2021-02-03 LAB
ANION GAP SERPL CALC-SCNC: 11 MMOL/L (ref 8–16)
BUN SERPL-MCNC: 21 MG/DL (ref 6–20)
CALCIUM SERPL-MCNC: 9.1 MG/DL (ref 8.7–10.5)
CHLORIDE SERPL-SCNC: 104 MMOL/L (ref 95–110)
CHOLEST SERPL-MCNC: 167 MG/DL (ref 120–199)
CHOLEST/HDLC SERPL: 2.9 {RATIO} (ref 2–5)
CO2 SERPL-SCNC: 26 MMOL/L (ref 23–29)
CREAT SERPL-MCNC: 1.2 MG/DL (ref 0.5–1.4)
EST. GFR  (AFRICAN AMERICAN): >60 ML/MIN/1.73 M^2
EST. GFR  (NON AFRICAN AMERICAN): >60 ML/MIN/1.73 M^2
GLUCOSE SERPL-MCNC: 91 MG/DL (ref 70–110)
HDLC SERPL-MCNC: 58 MG/DL (ref 40–75)
HDLC SERPL: 34.7 % (ref 20–50)
LDLC SERPL CALC-MCNC: 93 MG/DL (ref 63–159)
NONHDLC SERPL-MCNC: 109 MG/DL
POTASSIUM SERPL-SCNC: 4.6 MMOL/L (ref 3.5–5.1)
SODIUM SERPL-SCNC: 141 MMOL/L (ref 136–145)
TRIGL SERPL-MCNC: 80 MG/DL (ref 30–150)

## 2021-04-30 ENCOUNTER — OFFICE VISIT (OUTPATIENT)
Dept: FAMILY MEDICINE | Facility: CLINIC | Age: 31
End: 2021-04-30
Payer: COMMERCIAL

## 2021-04-30 ENCOUNTER — TELEPHONE (OUTPATIENT)
Dept: FAMILY MEDICINE | Facility: CLINIC | Age: 31
End: 2021-04-30

## 2021-04-30 DIAGNOSIS — E78.49 OTHER HYPERLIPIDEMIA: ICD-10-CM

## 2021-04-30 DIAGNOSIS — Z01.89 ENCOUNTER FOR LABORATORY TEST: ICD-10-CM

## 2021-04-30 DIAGNOSIS — F90.0 ADHD, PREDOMINANTLY INATTENTIVE TYPE: Primary | ICD-10-CM

## 2021-04-30 PROCEDURE — 99213 PR OFFICE/OUTPT VISIT, EST, LEVL III, 20-29 MIN: ICD-10-PCS | Mod: 95,,, | Performed by: INTERNAL MEDICINE

## 2021-04-30 PROCEDURE — 99213 OFFICE O/P EST LOW 20 MIN: CPT | Mod: 95,,, | Performed by: INTERNAL MEDICINE

## 2021-04-30 RX ORDER — LISDEXAMFETAMINE DIMESYLATE CAPSULES 20 MG/1
20 CAPSULE ORAL EVERY MORNING
Qty: 30 CAPSULE | Refills: 0 | Status: SHIPPED | OUTPATIENT
Start: 2021-04-30 | End: 2021-06-29 | Stop reason: SDUPTHER

## 2021-06-29 ENCOUNTER — OFFICE VISIT (OUTPATIENT)
Dept: FAMILY MEDICINE | Facility: CLINIC | Age: 31
End: 2021-06-29
Payer: COMMERCIAL

## 2021-06-29 DIAGNOSIS — F90.0 ADHD, PREDOMINANTLY INATTENTIVE TYPE: ICD-10-CM

## 2021-06-29 PROCEDURE — 99213 OFFICE O/P EST LOW 20 MIN: CPT | Mod: 95,,, | Performed by: FAMILY MEDICINE

## 2021-06-29 PROCEDURE — 99213 PR OFFICE/OUTPT VISIT, EST, LEVL III, 20-29 MIN: ICD-10-PCS | Mod: 95,,, | Performed by: FAMILY MEDICINE

## 2021-06-29 RX ORDER — LISDEXAMFETAMINE DIMESYLATE CAPSULES 20 MG/1
20 CAPSULE ORAL EVERY MORNING
Qty: 30 CAPSULE | Refills: 0 | Status: SHIPPED | OUTPATIENT
Start: 2021-08-29 | End: 2021-09-15

## 2021-06-29 RX ORDER — LISDEXAMFETAMINE DIMESYLATE CAPSULES 20 MG/1
20 CAPSULE ORAL EVERY MORNING
Qty: 30 CAPSULE | Refills: 0 | Status: SHIPPED | OUTPATIENT
Start: 2021-07-29 | End: 2021-09-15

## 2021-06-29 RX ORDER — LISDEXAMFETAMINE DIMESYLATE CAPSULES 20 MG/1
20 CAPSULE ORAL EVERY MORNING
Qty: 30 CAPSULE | Refills: 0 | Status: SHIPPED | OUTPATIENT
Start: 2021-06-29 | End: 2021-09-15

## 2021-07-17 ENCOUNTER — OFFICE VISIT (OUTPATIENT)
Dept: URGENT CARE | Facility: CLINIC | Age: 31
End: 2021-07-17
Payer: COMMERCIAL

## 2021-07-17 VITALS
OXYGEN SATURATION: 99 % | TEMPERATURE: 99 F | HEIGHT: 69 IN | RESPIRATION RATE: 17 BRPM | HEART RATE: 84 BPM | SYSTOLIC BLOOD PRESSURE: 120 MMHG | DIASTOLIC BLOOD PRESSURE: 81 MMHG | BODY MASS INDEX: 27.4 KG/M2 | WEIGHT: 185 LBS

## 2021-07-17 DIAGNOSIS — R05.9 COUGH: ICD-10-CM

## 2021-07-17 DIAGNOSIS — U07.1 COVID-19: Primary | ICD-10-CM

## 2021-07-17 DIAGNOSIS — U07.1 COVID-19 VIRUS DETECTED: ICD-10-CM

## 2021-07-17 LAB
CTP QC/QA: YES
SARS-COV-2 RDRP RESP QL NAA+PROBE: POSITIVE

## 2021-07-17 PROCEDURE — U0002: ICD-10-PCS | Mod: QW,S$GLB,, | Performed by: EMERGENCY MEDICINE

## 2021-07-17 PROCEDURE — 99214 PR OFFICE/OUTPT VISIT, EST, LEVL IV, 30-39 MIN: ICD-10-PCS | Mod: S$GLB,CS,, | Performed by: EMERGENCY MEDICINE

## 2021-07-17 PROCEDURE — 3008F BODY MASS INDEX DOCD: CPT | Mod: CPTII,S$GLB,, | Performed by: EMERGENCY MEDICINE

## 2021-07-17 PROCEDURE — U0002 COVID-19 LAB TEST NON-CDC: HCPCS | Mod: QW,S$GLB,, | Performed by: EMERGENCY MEDICINE

## 2021-07-17 PROCEDURE — 3008F PR BODY MASS INDEX (BMI) DOCUMENTED: ICD-10-PCS | Mod: CPTII,S$GLB,, | Performed by: EMERGENCY MEDICINE

## 2021-07-17 PROCEDURE — 99214 OFFICE O/P EST MOD 30 MIN: CPT | Mod: S$GLB,CS,, | Performed by: EMERGENCY MEDICINE

## 2021-09-01 ENCOUNTER — PATIENT MESSAGE (OUTPATIENT)
Dept: FAMILY MEDICINE | Facility: CLINIC | Age: 31
End: 2021-09-01

## 2021-09-15 ENCOUNTER — PATIENT MESSAGE (OUTPATIENT)
Dept: FAMILY MEDICINE | Facility: CLINIC | Age: 31
End: 2021-09-15

## 2021-09-15 DIAGNOSIS — F90.0 ADHD, PREDOMINANTLY INATTENTIVE TYPE: ICD-10-CM

## 2021-09-15 RX ORDER — LISDEXAMFETAMINE DIMESYLATE CAPSULES 20 MG/1
20 CAPSULE ORAL EVERY MORNING
Qty: 30 CAPSULE | Refills: 0 | Status: SHIPPED | OUTPATIENT
Start: 2021-09-15 | End: 2021-09-17 | Stop reason: SDUPTHER

## 2021-09-16 ENCOUNTER — PATIENT MESSAGE (OUTPATIENT)
Dept: FAMILY MEDICINE | Facility: CLINIC | Age: 31
End: 2021-09-16

## 2021-09-16 DIAGNOSIS — F90.0 ADHD, PREDOMINANTLY INATTENTIVE TYPE: ICD-10-CM

## 2021-09-17 RX ORDER — LISDEXAMFETAMINE DIMESYLATE CAPSULES 20 MG/1
20 CAPSULE ORAL EVERY MORNING
Qty: 30 CAPSULE | Refills: 0 | Status: SHIPPED | OUTPATIENT
Start: 2021-09-17 | End: 2021-09-29 | Stop reason: SDUPTHER

## 2021-09-21 ENCOUNTER — PATIENT MESSAGE (OUTPATIENT)
Dept: FAMILY MEDICINE | Facility: CLINIC | Age: 31
End: 2021-09-21

## 2021-09-29 ENCOUNTER — OFFICE VISIT (OUTPATIENT)
Dept: FAMILY MEDICINE | Facility: CLINIC | Age: 31
End: 2021-09-29
Payer: COMMERCIAL

## 2021-09-29 VITALS
BODY MASS INDEX: 26.89 KG/M2 | HEIGHT: 69 IN | DIASTOLIC BLOOD PRESSURE: 80 MMHG | WEIGHT: 181.56 LBS | SYSTOLIC BLOOD PRESSURE: 120 MMHG | HEART RATE: 78 BPM

## 2021-09-29 DIAGNOSIS — F90.0 ADHD, PREDOMINANTLY INATTENTIVE TYPE: Primary | ICD-10-CM

## 2021-09-29 PROCEDURE — 99213 PR OFFICE/OUTPT VISIT, EST, LEVL III, 20-29 MIN: ICD-10-PCS | Mod: S$GLB,,, | Performed by: FAMILY MEDICINE

## 2021-09-29 PROCEDURE — 3074F SYST BP LT 130 MM HG: CPT | Mod: CPTII,S$GLB,, | Performed by: FAMILY MEDICINE

## 2021-09-29 PROCEDURE — 3008F PR BODY MASS INDEX (BMI) DOCUMENTED: ICD-10-PCS | Mod: CPTII,S$GLB,, | Performed by: FAMILY MEDICINE

## 2021-09-29 PROCEDURE — 99999 PR PBB SHADOW E&M-EST. PATIENT-LVL III: ICD-10-PCS | Mod: PBBFAC,,, | Performed by: FAMILY MEDICINE

## 2021-09-29 PROCEDURE — 99213 OFFICE O/P EST LOW 20 MIN: CPT | Mod: S$GLB,,, | Performed by: FAMILY MEDICINE

## 2021-09-29 PROCEDURE — 1159F MED LIST DOCD IN RCRD: CPT | Mod: CPTII,S$GLB,, | Performed by: FAMILY MEDICINE

## 2021-09-29 PROCEDURE — 3079F PR MOST RECENT DIASTOLIC BLOOD PRESSURE 80-89 MM HG: ICD-10-PCS | Mod: CPTII,S$GLB,, | Performed by: FAMILY MEDICINE

## 2021-09-29 PROCEDURE — 1160F PR REVIEW ALL MEDS BY PRESCRIBER/CLIN PHARMACIST DOCUMENTED: ICD-10-PCS | Mod: CPTII,S$GLB,, | Performed by: FAMILY MEDICINE

## 2021-09-29 PROCEDURE — 3074F PR MOST RECENT SYSTOLIC BLOOD PRESSURE < 130 MM HG: ICD-10-PCS | Mod: CPTII,S$GLB,, | Performed by: FAMILY MEDICINE

## 2021-09-29 PROCEDURE — 1159F PR MEDICATION LIST DOCUMENTED IN MEDICAL RECORD: ICD-10-PCS | Mod: CPTII,S$GLB,, | Performed by: FAMILY MEDICINE

## 2021-09-29 PROCEDURE — 99999 PR PBB SHADOW E&M-EST. PATIENT-LVL III: CPT | Mod: PBBFAC,,, | Performed by: FAMILY MEDICINE

## 2021-09-29 PROCEDURE — 1160F RVW MEDS BY RX/DR IN RCRD: CPT | Mod: CPTII,S$GLB,, | Performed by: FAMILY MEDICINE

## 2021-09-29 PROCEDURE — 3008F BODY MASS INDEX DOCD: CPT | Mod: CPTII,S$GLB,, | Performed by: FAMILY MEDICINE

## 2021-09-29 PROCEDURE — 3079F DIAST BP 80-89 MM HG: CPT | Mod: CPTII,S$GLB,, | Performed by: FAMILY MEDICINE

## 2021-09-29 RX ORDER — LISDEXAMFETAMINE DIMESYLATE CAPSULES 20 MG/1
20 CAPSULE ORAL EVERY MORNING
Qty: 30 CAPSULE | Refills: 0 | Status: SHIPPED | OUTPATIENT
Start: 2021-12-17 | End: 2021-12-28 | Stop reason: SDUPTHER

## 2021-09-29 RX ORDER — LISDEXAMFETAMINE DIMESYLATE CAPSULES 20 MG/1
20 CAPSULE ORAL EVERY MORNING
Qty: 30 CAPSULE | Refills: 0 | Status: SHIPPED | OUTPATIENT
Start: 2021-10-17 | End: 2021-12-28 | Stop reason: SDUPTHER

## 2021-09-29 RX ORDER — LISDEXAMFETAMINE DIMESYLATE CAPSULES 20 MG/1
20 CAPSULE ORAL EVERY MORNING
Qty: 30 CAPSULE | Refills: 0 | Status: SHIPPED | OUTPATIENT
Start: 2021-11-17 | End: 2021-12-28 | Stop reason: SDUPTHER

## 2021-12-28 ENCOUNTER — OFFICE VISIT (OUTPATIENT)
Dept: FAMILY MEDICINE | Facility: CLINIC | Age: 31
End: 2021-12-28
Payer: COMMERCIAL

## 2021-12-28 DIAGNOSIS — F90.0 ADHD, PREDOMINANTLY INATTENTIVE TYPE: ICD-10-CM

## 2021-12-28 PROCEDURE — 1160F PR REVIEW ALL MEDS BY PRESCRIBER/CLIN PHARMACIST DOCUMENTED: ICD-10-PCS | Mod: CPTII,95,, | Performed by: FAMILY MEDICINE

## 2021-12-28 PROCEDURE — 1159F MED LIST DOCD IN RCRD: CPT | Mod: CPTII,95,, | Performed by: FAMILY MEDICINE

## 2021-12-28 PROCEDURE — 1159F PR MEDICATION LIST DOCUMENTED IN MEDICAL RECORD: ICD-10-PCS | Mod: CPTII,95,, | Performed by: FAMILY MEDICINE

## 2021-12-28 PROCEDURE — 99213 PR OFFICE/OUTPT VISIT, EST, LEVL III, 20-29 MIN: ICD-10-PCS | Mod: 95,,, | Performed by: FAMILY MEDICINE

## 2021-12-28 PROCEDURE — 99213 OFFICE O/P EST LOW 20 MIN: CPT | Mod: 95,,, | Performed by: FAMILY MEDICINE

## 2021-12-28 PROCEDURE — 1160F RVW MEDS BY RX/DR IN RCRD: CPT | Mod: CPTII,95,, | Performed by: FAMILY MEDICINE

## 2021-12-28 RX ORDER — LISDEXAMFETAMINE DIMESYLATE CAPSULES 20 MG/1
20 CAPSULE ORAL EVERY MORNING
Qty: 30 CAPSULE | Refills: 0 | Status: SHIPPED | OUTPATIENT
Start: 2022-02-28 | End: 2022-03-22 | Stop reason: SDUPTHER

## 2021-12-28 RX ORDER — LISDEXAMFETAMINE DIMESYLATE CAPSULES 20 MG/1
20 CAPSULE ORAL EVERY MORNING
Qty: 30 CAPSULE | Refills: 0 | Status: SHIPPED | OUTPATIENT
Start: 2022-01-28 | End: 2022-03-22 | Stop reason: SDUPTHER

## 2021-12-28 RX ORDER — LISDEXAMFETAMINE DIMESYLATE CAPSULES 20 MG/1
20 CAPSULE ORAL EVERY MORNING
Qty: 30 CAPSULE | Refills: 0 | Status: SHIPPED | OUTPATIENT
Start: 2021-12-28 | End: 2022-03-22 | Stop reason: SDUPTHER

## 2022-03-03 ENCOUNTER — PATIENT OUTREACH (OUTPATIENT)
Dept: ADMINISTRATIVE | Facility: OTHER | Age: 32
End: 2022-03-03
Payer: COMMERCIAL

## 2022-03-22 ENCOUNTER — OFFICE VISIT (OUTPATIENT)
Dept: FAMILY MEDICINE | Facility: CLINIC | Age: 32
End: 2022-03-22
Payer: COMMERCIAL

## 2022-03-22 VITALS
HEIGHT: 69 IN | WEIGHT: 186.31 LBS | SYSTOLIC BLOOD PRESSURE: 108 MMHG | HEART RATE: 78 BPM | BODY MASS INDEX: 27.6 KG/M2 | DIASTOLIC BLOOD PRESSURE: 66 MMHG

## 2022-03-22 DIAGNOSIS — F90.0 ADHD, PREDOMINANTLY INATTENTIVE TYPE: ICD-10-CM

## 2022-03-22 PROCEDURE — 1160F PR REVIEW ALL MEDS BY PRESCRIBER/CLIN PHARMACIST DOCUMENTED: ICD-10-PCS | Mod: CPTII,S$GLB,, | Performed by: FAMILY MEDICINE

## 2022-03-22 PROCEDURE — 99999 PR PBB SHADOW E&M-EST. PATIENT-LVL III: ICD-10-PCS | Mod: PBBFAC,,, | Performed by: FAMILY MEDICINE

## 2022-03-22 PROCEDURE — 3078F DIAST BP <80 MM HG: CPT | Mod: CPTII,S$GLB,, | Performed by: FAMILY MEDICINE

## 2022-03-22 PROCEDURE — 3008F BODY MASS INDEX DOCD: CPT | Mod: CPTII,S$GLB,, | Performed by: FAMILY MEDICINE

## 2022-03-22 PROCEDURE — 3074F PR MOST RECENT SYSTOLIC BLOOD PRESSURE < 130 MM HG: ICD-10-PCS | Mod: CPTII,S$GLB,, | Performed by: FAMILY MEDICINE

## 2022-03-22 PROCEDURE — 1160F RVW MEDS BY RX/DR IN RCRD: CPT | Mod: CPTII,S$GLB,, | Performed by: FAMILY MEDICINE

## 2022-03-22 PROCEDURE — 1159F MED LIST DOCD IN RCRD: CPT | Mod: CPTII,S$GLB,, | Performed by: FAMILY MEDICINE

## 2022-03-22 PROCEDURE — 99999 PR PBB SHADOW E&M-EST. PATIENT-LVL III: CPT | Mod: PBBFAC,,, | Performed by: FAMILY MEDICINE

## 2022-03-22 PROCEDURE — 99213 PR OFFICE/OUTPT VISIT, EST, LEVL III, 20-29 MIN: ICD-10-PCS | Mod: S$GLB,,, | Performed by: FAMILY MEDICINE

## 2022-03-22 PROCEDURE — 3078F PR MOST RECENT DIASTOLIC BLOOD PRESSURE < 80 MM HG: ICD-10-PCS | Mod: CPTII,S$GLB,, | Performed by: FAMILY MEDICINE

## 2022-03-22 PROCEDURE — 3074F SYST BP LT 130 MM HG: CPT | Mod: CPTII,S$GLB,, | Performed by: FAMILY MEDICINE

## 2022-03-22 PROCEDURE — 3008F PR BODY MASS INDEX (BMI) DOCUMENTED: ICD-10-PCS | Mod: CPTII,S$GLB,, | Performed by: FAMILY MEDICINE

## 2022-03-22 PROCEDURE — 1159F PR MEDICATION LIST DOCUMENTED IN MEDICAL RECORD: ICD-10-PCS | Mod: CPTII,S$GLB,, | Performed by: FAMILY MEDICINE

## 2022-03-22 PROCEDURE — 99213 OFFICE O/P EST LOW 20 MIN: CPT | Mod: S$GLB,,, | Performed by: FAMILY MEDICINE

## 2022-03-22 RX ORDER — LISDEXAMFETAMINE DIMESYLATE CAPSULES 20 MG/1
20 CAPSULE ORAL EVERY MORNING
Qty: 30 CAPSULE | Refills: 0 | Status: SHIPPED | OUTPATIENT
Start: 2022-04-28 | End: 2022-06-24 | Stop reason: SDUPTHER

## 2022-03-22 RX ORDER — LISDEXAMFETAMINE DIMESYLATE CAPSULES 20 MG/1
20 CAPSULE ORAL EVERY MORNING
Qty: 30 CAPSULE | Refills: 0 | Status: SHIPPED | OUTPATIENT
Start: 2022-03-28 | End: 2022-06-24 | Stop reason: SDUPTHER

## 2022-03-22 RX ORDER — LISDEXAMFETAMINE DIMESYLATE CAPSULES 20 MG/1
20 CAPSULE ORAL EVERY MORNING
Qty: 30 CAPSULE | Refills: 0 | Status: SHIPPED | OUTPATIENT
Start: 2022-05-28 | End: 2022-06-24 | Stop reason: SDUPTHER
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
## 2022-06-24 ENCOUNTER — OFFICE VISIT (OUTPATIENT)
Dept: FAMILY MEDICINE | Facility: CLINIC | Age: 32
End: 2022-06-24
Payer: COMMERCIAL

## 2022-06-24 VITALS
OXYGEN SATURATION: 98 % | BODY MASS INDEX: 28.29 KG/M2 | SYSTOLIC BLOOD PRESSURE: 133 MMHG | HEIGHT: 69 IN | HEART RATE: 89 BPM | WEIGHT: 191 LBS | DIASTOLIC BLOOD PRESSURE: 83 MMHG

## 2022-06-24 DIAGNOSIS — F90.0 ADHD, PREDOMINANTLY INATTENTIVE TYPE: ICD-10-CM

## 2022-06-24 PROCEDURE — 99999 PR PBB SHADOW E&M-EST. PATIENT-LVL III: ICD-10-PCS | Mod: PBBFAC,,, | Performed by: FAMILY MEDICINE

## 2022-06-24 PROCEDURE — 3075F PR MOST RECENT SYSTOLIC BLOOD PRESS GE 130-139MM HG: ICD-10-PCS | Mod: CPTII,S$GLB,, | Performed by: FAMILY MEDICINE

## 2022-06-24 PROCEDURE — 3079F PR MOST RECENT DIASTOLIC BLOOD PRESSURE 80-89 MM HG: ICD-10-PCS | Mod: CPTII,S$GLB,, | Performed by: FAMILY MEDICINE

## 2022-06-24 PROCEDURE — 1159F PR MEDICATION LIST DOCUMENTED IN MEDICAL RECORD: ICD-10-PCS | Mod: CPTII,S$GLB,, | Performed by: FAMILY MEDICINE

## 2022-06-24 PROCEDURE — 1159F MED LIST DOCD IN RCRD: CPT | Mod: CPTII,S$GLB,, | Performed by: FAMILY MEDICINE

## 2022-06-24 PROCEDURE — 3079F DIAST BP 80-89 MM HG: CPT | Mod: CPTII,S$GLB,, | Performed by: FAMILY MEDICINE

## 2022-06-24 PROCEDURE — 99213 OFFICE O/P EST LOW 20 MIN: CPT | Mod: S$GLB,,, | Performed by: FAMILY MEDICINE

## 2022-06-24 PROCEDURE — 3008F PR BODY MASS INDEX (BMI) DOCUMENTED: ICD-10-PCS | Mod: CPTII,S$GLB,, | Performed by: FAMILY MEDICINE

## 2022-06-24 PROCEDURE — 3075F SYST BP GE 130 - 139MM HG: CPT | Mod: CPTII,S$GLB,, | Performed by: FAMILY MEDICINE

## 2022-06-24 PROCEDURE — 99999 PR PBB SHADOW E&M-EST. PATIENT-LVL III: CPT | Mod: PBBFAC,,, | Performed by: FAMILY MEDICINE

## 2022-06-24 PROCEDURE — 99213 PR OFFICE/OUTPT VISIT, EST, LEVL III, 20-29 MIN: ICD-10-PCS | Mod: S$GLB,,, | Performed by: FAMILY MEDICINE

## 2022-06-24 PROCEDURE — 3008F BODY MASS INDEX DOCD: CPT | Mod: CPTII,S$GLB,, | Performed by: FAMILY MEDICINE

## 2022-06-24 RX ORDER — LISDEXAMFETAMINE DIMESYLATE CAPSULES 20 MG/1
20 CAPSULE ORAL EVERY MORNING
Qty: 30 CAPSULE | Refills: 0 | Status: SHIPPED | OUTPATIENT
Start: 2022-07-24 | End: 2022-09-26 | Stop reason: SDUPTHER

## 2022-06-24 RX ORDER — LISDEXAMFETAMINE DIMESYLATE CAPSULES 20 MG/1
20 CAPSULE ORAL EVERY MORNING
Qty: 30 CAPSULE | Refills: 0 | Status: SHIPPED | OUTPATIENT
Start: 2022-06-24 | End: 2022-09-26 | Stop reason: SDUPTHER

## 2022-06-24 RX ORDER — LISDEXAMFETAMINE DIMESYLATE CAPSULES 20 MG/1
20 CAPSULE ORAL EVERY MORNING
Qty: 30 CAPSULE | Refills: 0 | Status: SHIPPED | OUTPATIENT
Start: 2022-08-24 | End: 2022-09-26 | Stop reason: SDUPTHER
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
## 2022-09-26 ENCOUNTER — PATIENT MESSAGE (OUTPATIENT)
Dept: FAMILY MEDICINE | Facility: CLINIC | Age: 32
End: 2022-09-26

## 2022-09-26 ENCOUNTER — OFFICE VISIT (OUTPATIENT)
Dept: FAMILY MEDICINE | Facility: CLINIC | Age: 32
End: 2022-09-26
Payer: COMMERCIAL

## 2022-09-26 VITALS
HEART RATE: 91 BPM | BODY MASS INDEX: 29.62 KG/M2 | OXYGEN SATURATION: 98 % | HEIGHT: 69 IN | SYSTOLIC BLOOD PRESSURE: 137 MMHG | WEIGHT: 200 LBS | DIASTOLIC BLOOD PRESSURE: 91 MMHG

## 2022-09-26 DIAGNOSIS — F90.0 ADHD, PREDOMINANTLY INATTENTIVE TYPE: ICD-10-CM

## 2022-09-26 PROCEDURE — 1159F PR MEDICATION LIST DOCUMENTED IN MEDICAL RECORD: ICD-10-PCS | Mod: CPTII,S$GLB,, | Performed by: FAMILY MEDICINE

## 2022-09-26 PROCEDURE — 3080F PR MOST RECENT DIASTOLIC BLOOD PRESSURE >= 90 MM HG: ICD-10-PCS | Mod: CPTII,S$GLB,, | Performed by: FAMILY MEDICINE

## 2022-09-26 PROCEDURE — 99213 OFFICE O/P EST LOW 20 MIN: CPT | Mod: 25,S$GLB,, | Performed by: FAMILY MEDICINE

## 2022-09-26 PROCEDURE — 3008F BODY MASS INDEX DOCD: CPT | Mod: CPTII,S$GLB,, | Performed by: FAMILY MEDICINE

## 2022-09-26 PROCEDURE — 3075F SYST BP GE 130 - 139MM HG: CPT | Mod: CPTII,S$GLB,, | Performed by: FAMILY MEDICINE

## 2022-09-26 PROCEDURE — 3080F DIAST BP >= 90 MM HG: CPT | Mod: CPTII,S$GLB,, | Performed by: FAMILY MEDICINE

## 2022-09-26 PROCEDURE — 3075F PR MOST RECENT SYSTOLIC BLOOD PRESS GE 130-139MM HG: ICD-10-PCS | Mod: CPTII,S$GLB,, | Performed by: FAMILY MEDICINE

## 2022-09-26 PROCEDURE — 90686 IIV4 VACC NO PRSV 0.5 ML IM: CPT | Mod: S$GLB,,, | Performed by: FAMILY MEDICINE

## 2022-09-26 PROCEDURE — 90686 FLU VACCINE (QUAD) GREATER THAN OR EQUAL TO 3YO PRESERVATIVE FREE IM: ICD-10-PCS | Mod: S$GLB,,, | Performed by: FAMILY MEDICINE

## 2022-09-26 PROCEDURE — 1159F MED LIST DOCD IN RCRD: CPT | Mod: CPTII,S$GLB,, | Performed by: FAMILY MEDICINE

## 2022-09-26 PROCEDURE — 99999 PR PBB SHADOW E&M-EST. PATIENT-LVL III: CPT | Mod: PBBFAC,,, | Performed by: FAMILY MEDICINE

## 2022-09-26 PROCEDURE — 99213 PR OFFICE/OUTPT VISIT, EST, LEVL III, 20-29 MIN: ICD-10-PCS | Mod: 25,S$GLB,, | Performed by: FAMILY MEDICINE

## 2022-09-26 PROCEDURE — 90471 IMMUNIZATION ADMIN: CPT | Mod: S$GLB,,, | Performed by: FAMILY MEDICINE

## 2022-09-26 PROCEDURE — 99999 PR PBB SHADOW E&M-EST. PATIENT-LVL III: ICD-10-PCS | Mod: PBBFAC,,, | Performed by: FAMILY MEDICINE

## 2022-09-26 PROCEDURE — 3008F PR BODY MASS INDEX (BMI) DOCUMENTED: ICD-10-PCS | Mod: CPTII,S$GLB,, | Performed by: FAMILY MEDICINE

## 2022-09-26 PROCEDURE — 90471 FLU VACCINE (QUAD) GREATER THAN OR EQUAL TO 3YO PRESERVATIVE FREE IM: ICD-10-PCS | Mod: S$GLB,,, | Performed by: FAMILY MEDICINE

## 2022-09-26 RX ORDER — LISDEXAMFETAMINE DIMESYLATE CAPSULES 20 MG/1
20 CAPSULE ORAL EVERY MORNING
Qty: 30 CAPSULE | Refills: 0 | Status: SHIPPED | OUTPATIENT
Start: 2022-09-26 | End: 2022-11-02 | Stop reason: SDUPTHER

## 2022-09-26 RX ORDER — LISDEXAMFETAMINE DIMESYLATE CAPSULES 20 MG/1
20 CAPSULE ORAL EVERY MORNING
Qty: 30 CAPSULE | Refills: 0 | Status: SHIPPED | OUTPATIENT
Start: 2022-10-26 | End: 2022-11-07

## 2022-09-26 RX ORDER — LISDEXAMFETAMINE DIMESYLATE CAPSULES 20 MG/1
20 CAPSULE ORAL EVERY MORNING
Qty: 30 CAPSULE | Refills: 0 | Status: SHIPPED | OUTPATIENT
Start: 2022-11-25 | End: 2022-09-26 | Stop reason: SDUPTHER

## 2022-09-26 RX ORDER — LISDEXAMFETAMINE DIMESYLATE CAPSULES 20 MG/1
20 CAPSULE ORAL EVERY MORNING
Qty: 30 CAPSULE | Refills: 0 | Status: SHIPPED | OUTPATIENT
Start: 2022-09-26 | End: 2022-11-07

## 2022-09-26 NOTE — PROGRESS NOTES
After attaining consent, in per orders of Dr. Padilla, injection of Lot ([id number]) Expiration: 6/30/2023 given in by Johnson Ortega. Patient tolerated well and band aid applied. Patient instructed to remain in clinic for 15 minutes. Afterwards, and to report any adverse reactions to me immediately. Patient verbalized understanding.

## 2022-09-26 NOTE — PROGRESS NOTES
"Subjective:       Patient ID: Jamshid Michaels is a 32 y.o. male.    Chief Complaint: ADHD    HPI  Came in today for follow-up on ADHD.  Has been taking Vyvanse with good effect and no side effects.  Continues to feel that this medication is necessary and helpful for him to get through his work.      Social History     Social History Narrative    Not on file       Family History   Problem Relation Age of Onset    No Known Problems Mother     Hypertension Father     Kidney disease Father         kidney donor    Diabetes Father     Thyroid cancer Maternal Aunt     Thyroid cancer Maternal Grandfather     Diabetes Paternal Uncle     Learning disabilities Maternal Grandmother         kidney stones       Current Outpatient Medications:     [START ON 11/25/2022] lisdexamfetamine (VYVANSE) 20 MG capsule, Take 1 capsule (20 mg total) by mouth every morning., Disp: 30 capsule, Rfl: 0    [START ON 10/26/2022] lisdexamfetamine (VYVANSE) 20 MG capsule, Take 1 capsule (20 mg total) by mouth every morning., Disp: 30 capsule, Rfl: 0    lisdexamfetamine (VYVANSE) 20 MG capsule, Take 1 capsule (20 mg total) by mouth every morning., Disp: 30 capsule, Rfl: 0    Review of Systems   Constitutional:  Negative for chills and fever.   Eyes:  Negative for visual disturbance.   Respiratory:  Negative for cough and shortness of breath.    Cardiovascular:  Negative for chest pain.   Gastrointestinal:  Negative for abdominal pain.   Neurological:  Negative for dizziness.     Objective:   BP (!) 137/91   Pulse 91   Ht 5' 9" (1.753 m)   Wt 90.7 kg (200 lb)   SpO2 98%   BMI 29.53 kg/m²      Physical Exam  Vitals reviewed.   Constitutional:       Appearance: He is well-developed.   HENT:      Head: Normocephalic and atraumatic.   Eyes:      Conjunctiva/sclera: Conjunctivae normal.   Cardiovascular:      Rate and Rhythm: Normal rate.   Pulmonary:      Effort: Pulmonary effort is normal. No respiratory distress.   Skin:     General: Skin is warm " and dry.      Findings: No rash.   Neurological:      Mental Status: He is alert and oriented to person, place, and time.      Coordination: Coordination normal.   Psychiatric:         Behavior: Behavior normal.       Assessment & Plan     Problem List Items Addressed This Visit          Psychiatric    ADHD, predominantly inattentive type    Relevant Medications    lisdexamfetamine (VYVANSE) 20 MG capsule (Start on 11/25/2022)    lisdexamfetamine (VYVANSE) 20 MG capsule (Start on 10/26/2022)    lisdexamfetamine (VYVANSE) 20 MG capsule         Immunizations Administered on Date of Encounter - 9/26/2022       Name Date Dose VIS Date Route Exp Date    Influenza - Quadrivalent - PF *Preferred* (6 months and older) 9/26/2022  2:19 PM 0.5 mL 8/6/2021 Intramuscular 6/30/2023    Site: Left deltoid     Given By: Johnson Ortega LPN     : [place]     Lot: [id number]              No follow-ups on file.    Disclaimer:  This note may have been prepared using voice recognition software, it may have not been extensively proofed, as such there could be errors within the text such as sound alike errors.

## 2022-09-26 NOTE — TELEPHONE ENCOUNTER
No new care gaps identified.  [facility] Embedded Care Gaps. Reference number: [id number]. 9/26/2022   3:18:54 PM CDT

## 2022-11-02 DIAGNOSIS — F90.0 ADHD, PREDOMINANTLY INATTENTIVE TYPE: ICD-10-CM

## 2022-11-02 RX ORDER — LISDEXAMFETAMINE DIMESYLATE CAPSULES 20 MG/1
20 CAPSULE ORAL EVERY MORNING
Qty: 30 CAPSULE | Refills: 0 | Status: SHIPPED | OUTPATIENT
Start: 2022-11-02 | End: 2022-11-07

## 2022-11-02 NOTE — TELEPHONE ENCOUNTER
No new care gaps identified.  [facility] Embedded Care Gaps. Reference number: [id number]. 11/02/2022   1:35:28 PM CDT

## 2022-11-02 NOTE — TELEPHONE ENCOUNTER
----- Message from Elsa Hoyos sent at 11/1/2022 12:08 PM CDT -----  Regarding: RX consult  Contact: JAMSHID DAVIS [[id number]]  Name of Who is Calling: Jamshid Davis            What is the request in detail: Pt states the pharmacy doesn't have RX lisdexamfetamine (VYVANSE) 20 MG capsule, and he is requesting the RX be sent to a different pharmacy.   Please sent to   [facility]'s  [address], [place] [address]  : [phone]         Can the clinic reply by [facility]:No           What Number to Call Back if not in [facility]:[phone]

## 2022-11-07 ENCOUNTER — TELEPHONE (OUTPATIENT)
Dept: FAMILY MEDICINE | Facility: CLINIC | Age: 32
End: 2022-11-07
Payer: COMMERCIAL

## 2022-11-07 ENCOUNTER — PATIENT MESSAGE (OUTPATIENT)
Dept: INTERNAL MEDICINE | Facility: CLINIC | Age: 32
End: 2022-11-07
Payer: COMMERCIAL

## 2022-11-07 ENCOUNTER — PATIENT MESSAGE (OUTPATIENT)
Dept: FAMILY MEDICINE | Facility: CLINIC | Age: 32
End: 2022-11-07
Payer: COMMERCIAL

## 2022-11-07 RX ORDER — LISDEXAMFETAMINE DIMESYLATE 30 MG/1
30 CAPSULE ORAL EVERY MORNING
Qty: 30 CAPSULE | Refills: 0 | Status: SHIPPED | OUTPATIENT
Start: 2022-11-07 | End: 2022-11-07 | Stop reason: SDUPTHER

## 2022-11-07 RX ORDER — LISDEXAMFETAMINE DIMESYLATE 30 MG/1
30 CAPSULE ORAL EVERY MORNING
Qty: 30 CAPSULE | Refills: 0 | Status: SHIPPED | OUTPATIENT
Start: 2022-11-07 | End: 2022-11-09 | Stop reason: SDUPTHER

## 2022-11-07 NOTE — TELEPHONE ENCOUNTER
----- Message from Saúl Batista sent at 11/7/2022 11:00 AM CST -----   Name of Who is Calling:     What is the request in detail:  patient request call back in reference to medication   lisdexamfetamine (VYVANSE) 20 MG capsule   / patient state he has been out of medication for 2 weeks / pharmacy's are out of 20 MG but has 30 MG   patient want to know if can change dosage so he can get medication   Please contact to further discuss and advise      Can the clinic reply by [facility]:     What Number to Call Back if not in [facility]:  [phone]

## 2022-11-07 NOTE — TELEPHONE ENCOUNTER
No new care gaps identified.  [facility] Embedded Care Gaps. Reference number: [id number]. 11/07/2022   1:38:35 PM CST

## 2022-11-09 RX ORDER — LISDEXAMFETAMINE DIMESYLATE 30 MG/1
30 CAPSULE ORAL EVERY MORNING
Qty: 30 CAPSULE | Refills: 0 | Status: SHIPPED | OUTPATIENT
Start: 2022-11-09 | End: 2023-01-18 | Stop reason: SDUPTHER

## 2022-11-09 RX ORDER — LISDEXAMFETAMINE DIMESYLATE 30 MG/1
30 CAPSULE ORAL EVERY MORNING
Qty: 30 CAPSULE | Refills: 0 | Status: SHIPPED | OUTPATIENT
Start: 2022-11-09 | End: 2022-11-09 | Stop reason: SDUPTHER

## 2022-11-09 NOTE — TELEPHONE ENCOUNTER
----- Message from Lisa Oakes sent at 11/9/2022 10:19 AM CST -----  Regarding: Refill Request  Can the clinic reply in [facility]:NO          Please refill the medication(s) listed below. Please call the patient when the prescription(s) is ready for  at this phone number  [phone]           Medication #1  lisdexamfetamine (VYVANSE) 30 MG capsule             Medication #2               Preferred Pharmacy:  [id number] - [address] AT [facility]

## 2022-11-09 NOTE — TELEPHONE ENCOUNTER
No new care gaps identified.  [facility] Embedded Care Gaps. Reference number: [id number]. 11/09/2022   10:28:36 AM CST

## 2022-11-16 ENCOUNTER — PATIENT OUTREACH (OUTPATIENT)
Dept: ADMINISTRATIVE | Facility: HOSPITAL | Age: 32
End: 2022-11-16
Payer: COMMERCIAL

## 2022-11-30 ENCOUNTER — OFFICE VISIT (OUTPATIENT)
Dept: URGENT CARE | Facility: CLINIC | Age: 32
End: 2022-11-30
Payer: COMMERCIAL

## 2022-11-30 VITALS
BODY MASS INDEX: 29.62 KG/M2 | RESPIRATION RATE: 19 BRPM | SYSTOLIC BLOOD PRESSURE: 131 MMHG | OXYGEN SATURATION: 98 % | TEMPERATURE: 100 F | HEART RATE: 107 BPM | WEIGHT: 200 LBS | HEIGHT: 69 IN | DIASTOLIC BLOOD PRESSURE: 84 MMHG

## 2022-11-30 DIAGNOSIS — J10.1 INFLUENZA A: Primary | ICD-10-CM

## 2022-11-30 LAB
CTP QC/QA: YES
CTP QC/QA: YES
POC MOLECULAR INFLUENZA A AGN: POSITIVE
POC MOLECULAR INFLUENZA B AGN: NEGATIVE
SARS-COV-2 AG RESP QL IA.RAPID: NEGATIVE

## 2022-11-30 PROCEDURE — 1159F PR MEDICATION LIST DOCUMENTED IN MEDICAL RECORD: ICD-10-PCS | Mod: CPTII,S$GLB,, | Performed by: STUDENT IN AN ORGANIZED HEALTH CARE EDUCATION/TRAINING PROGRAM

## 2022-11-30 PROCEDURE — 3008F PR BODY MASS INDEX (BMI) DOCUMENTED: ICD-10-PCS | Mod: CPTII,S$GLB,, | Performed by: STUDENT IN AN ORGANIZED HEALTH CARE EDUCATION/TRAINING PROGRAM

## 2022-11-30 PROCEDURE — 87811 SARS-COV-2 COVID19 W/OPTIC: CPT | Mod: QW,S$GLB,, | Performed by: STUDENT IN AN ORGANIZED HEALTH CARE EDUCATION/TRAINING PROGRAM

## 2022-11-30 PROCEDURE — 99213 PR OFFICE/OUTPT VISIT, EST, LEVL III, 20-29 MIN: ICD-10-PCS | Mod: S$GLB,,, | Performed by: STUDENT IN AN ORGANIZED HEALTH CARE EDUCATION/TRAINING PROGRAM

## 2022-11-30 PROCEDURE — 3075F PR MOST RECENT SYSTOLIC BLOOD PRESS GE 130-139MM HG: ICD-10-PCS | Mod: CPTII,S$GLB,, | Performed by: STUDENT IN AN ORGANIZED HEALTH CARE EDUCATION/TRAINING PROGRAM

## 2022-11-30 PROCEDURE — 87502 POCT INFLUENZA A/B MOLECULAR: ICD-10-PCS | Mod: QW,S$GLB,, | Performed by: STUDENT IN AN ORGANIZED HEALTH CARE EDUCATION/TRAINING PROGRAM

## 2022-11-30 PROCEDURE — 3079F DIAST BP 80-89 MM HG: CPT | Mod: CPTII,S$GLB,, | Performed by: STUDENT IN AN ORGANIZED HEALTH CARE EDUCATION/TRAINING PROGRAM

## 2022-11-30 PROCEDURE — 3079F PR MOST RECENT DIASTOLIC BLOOD PRESSURE 80-89 MM HG: ICD-10-PCS | Mod: CPTII,S$GLB,, | Performed by: STUDENT IN AN ORGANIZED HEALTH CARE EDUCATION/TRAINING PROGRAM

## 2022-11-30 PROCEDURE — 99213 OFFICE O/P EST LOW 20 MIN: CPT | Mod: S$GLB,,, | Performed by: STUDENT IN AN ORGANIZED HEALTH CARE EDUCATION/TRAINING PROGRAM

## 2022-11-30 PROCEDURE — 1160F RVW MEDS BY RX/DR IN RCRD: CPT | Mod: CPTII,S$GLB,, | Performed by: STUDENT IN AN ORGANIZED HEALTH CARE EDUCATION/TRAINING PROGRAM

## 2022-11-30 PROCEDURE — 3075F SYST BP GE 130 - 139MM HG: CPT | Mod: CPTII,S$GLB,, | Performed by: STUDENT IN AN ORGANIZED HEALTH CARE EDUCATION/TRAINING PROGRAM

## 2022-11-30 PROCEDURE — 3008F BODY MASS INDEX DOCD: CPT | Mod: CPTII,S$GLB,, | Performed by: STUDENT IN AN ORGANIZED HEALTH CARE EDUCATION/TRAINING PROGRAM

## 2022-11-30 PROCEDURE — 1160F PR REVIEW ALL MEDS BY PRESCRIBER/CLIN PHARMACIST DOCUMENTED: ICD-10-PCS | Mod: CPTII,S$GLB,, | Performed by: STUDENT IN AN ORGANIZED HEALTH CARE EDUCATION/TRAINING PROGRAM

## 2022-11-30 PROCEDURE — 87502 INFLUENZA DNA AMP PROBE: CPT | Mod: QW,S$GLB,, | Performed by: STUDENT IN AN ORGANIZED HEALTH CARE EDUCATION/TRAINING PROGRAM

## 2022-11-30 PROCEDURE — 87811 SARS CORONAVIRUS 2 ANTIGEN POCT, MANUAL READ: ICD-10-PCS | Mod: QW,S$GLB,, | Performed by: STUDENT IN AN ORGANIZED HEALTH CARE EDUCATION/TRAINING PROGRAM

## 2022-11-30 PROCEDURE — 1159F MED LIST DOCD IN RCRD: CPT | Mod: CPTII,S$GLB,, | Performed by: STUDENT IN AN ORGANIZED HEALTH CARE EDUCATION/TRAINING PROGRAM

## 2022-11-30 NOTE — LETTER
November 30, 2022      Urgent Care - [address]  [facility] [address]  Phone: [phone]  Fax: [phone]       Patient: Jamshid Michaels   YOB: 1990  Date of Visit: 11/30/2022    To Whom It May Concern:    Ad Michaels  was at [facility] on 11/30/2022. The patient may return to work when he is afebrile for 24 hours without the use of fever reducing medications (e.g. Tylenol, Motrin). If you have any questions or concerns, or if I can be of further assistance, please do not hesitate to contact me.    Sincerely,      Rosa Rivas MD

## 2022-11-30 NOTE — PROGRESS NOTES
"Subjective:       Patient ID: Jamshid Michaels is a 32 y.o. male.    Vitals:  height is 5' 9" (1.753 m) and weight is 90.7 kg (200 lb). His temperature is 99.8 °F (37.7 °C). His blood pressure is 131/84 and his pulse is 107. His respiration is 19 and oxygen saturation is 98%.     Chief Complaint: Sinus Problem    Sinus Problem  This is a new problem. The current episode started today. The problem is unchanged. There has been no fever. His pain is at a severity of 3/10. The pain is mild. Associated symptoms include coughing, headaches, sinus pressure and sneezing. Pertinent negatives include no diaphoresis, ear pain, neck pain, shortness of breath, sore throat or swollen glands. Past treatments include acetaminophen. The treatment provided mild relief.     Constitution: Negative for sweating.   HENT:  Positive for sinus pressure. Negative for ear pain and sore throat.    Neck: Negative for neck pain.   Respiratory:  Positive for cough and sputum production. Negative for shortness of breath.    Allergic/Immunologic: Positive for sneezing.   Neurological:  Positive for headaches.     Objective:      Physical Exam   Constitutional: He is oriented to person, place, and time. He does not appear ill. No distress.   HENT:   Head: Normocephalic.   Eyes: Conjunctivae are normal.   Pulmonary/Chest: Effort normal and breath sounds normal. No respiratory distress.   Neurological: He is alert and oriented to person, place, and time. Coordination and gait normal.   Skin: Skin is warm and dry.   Psychiatric: His behavior is normal. Mood and thought content normal.   Nursing note and vitals reviewed.      Assessment:       1. Influenza A        Results for orders placed or performed in visit on 11/30/22   SARS Coronavirus 2 Antigen, POCT Manual Read   Result Value Ref Range    SARS Coronavirus 2 Antigen Negative Negative     Acceptable Yes    POCT Influenza A/B MOLECULAR   Result Value Ref Range    POC Molecular " Influenza A Ag Positive (A) Negative, Not Reported    POC Molecular Influenza B Ag Negative Negative, Not Reported     Acceptable Yes        Plan:         Influenza A  -     SARS Coronavirus 2 Antigen, POCT Manual Read  -     POCT Influenza A/B MOLECULAR       Stable vitals, normal lung exam. Diagnosis of flu discussed with the patient as well as the expected course and duration of his  symptoms.  No high-risk comorbidities or immunocompromise, therefore no indication for antiviral medication at this time.  Use OTC medications for symptom relief if needed and as directed.  Indications for follow-up were discussed.  All questions and concerns were addressed prior to discharge. Patient verbalized understanding and was agreeable with the plan of care.

## 2022-11-30 NOTE — PATIENT INSTRUCTIONS
Below are suggestions for symptomatic relief of your upper respiratory symptoms:              -Salt water gargles to soothe throat pain.              -Chloroseptic spray and Cepacol lozenges also help to numb throat pain.              -Warm herbal teas with honey/lemon/сергей can help soothe sore throat and hoarseness              -Nasal saline spray reduces inflammation and dryness.              -Warm face compresses to help with facial sinus pain/pressure.              -Humidifiers and steam can help with nasal dryness and congestion              -Vicks vapor rub at night.              -Flonase OTC or Nasacort OTC for nasal congestion and post-nasal drip. This can also be helpful for inflammation in the throat that can make you cough. Ok to use twice daily for the first week, then reduce to once daily after symptoms have begun to improve.              -Afrin is a nasal spray that can give immediate relief of nasal congestion but you cannot use this medication for more than 3 days              -Simple foods like chicken noodle soup.              -Multi-symptom cold/flu or cold/sinus will incorporate medications for cough, congestion, pain/fever. If you would prefer to isolate symptomatic treatment, continue reading below.               - Mucinex for congestion or Mucinex DM or Delsym for cough. Mucinex-D if you have sinus pressure/sinus pain or chest congestion. (caution if history of high blood pressure or palpitations). You must increase your water intake when using expectorants (Mucinex).             -If you DO NOT have Hypertension or any history of palpitations, it is ok to take over the counter Sudafed or Mucinex D   -If you DO have Hypertension or palpitations, it is safe to take Coricidin HBP for relief of sinus symptoms.

## 2023-01-18 ENCOUNTER — OFFICE VISIT (OUTPATIENT)
Dept: FAMILY MEDICINE | Facility: CLINIC | Age: 33
End: 2023-01-18
Payer: COMMERCIAL

## 2023-01-18 ENCOUNTER — PATIENT MESSAGE (OUTPATIENT)
Dept: FAMILY MEDICINE | Facility: CLINIC | Age: 33
End: 2023-01-18

## 2023-01-18 VITALS
DIASTOLIC BLOOD PRESSURE: 77 MMHG | HEART RATE: 81 BPM | WEIGHT: 196.63 LBS | OXYGEN SATURATION: 95 % | BODY MASS INDEX: 29.03 KG/M2 | SYSTOLIC BLOOD PRESSURE: 129 MMHG

## 2023-01-18 DIAGNOSIS — F90.0 ADHD, PREDOMINANTLY INATTENTIVE TYPE: Primary | ICD-10-CM

## 2023-01-18 PROCEDURE — 1159F PR MEDICATION LIST DOCUMENTED IN MEDICAL RECORD: ICD-10-PCS | Mod: CPTII,S$GLB,, | Performed by: FAMILY MEDICINE

## 2023-01-18 PROCEDURE — 99213 OFFICE O/P EST LOW 20 MIN: CPT | Mod: S$GLB,,, | Performed by: FAMILY MEDICINE

## 2023-01-18 PROCEDURE — 3078F DIAST BP <80 MM HG: CPT | Mod: CPTII,S$GLB,, | Performed by: FAMILY MEDICINE

## 2023-01-18 PROCEDURE — 3008F BODY MASS INDEX DOCD: CPT | Mod: CPTII,S$GLB,, | Performed by: FAMILY MEDICINE

## 2023-01-18 PROCEDURE — 99999 PR PBB SHADOW E&M-EST. PATIENT-LVL III: CPT | Mod: PBBFAC,,, | Performed by: FAMILY MEDICINE

## 2023-01-18 PROCEDURE — 3074F SYST BP LT 130 MM HG: CPT | Mod: CPTII,S$GLB,, | Performed by: FAMILY MEDICINE

## 2023-01-18 PROCEDURE — 3074F PR MOST RECENT SYSTOLIC BLOOD PRESSURE < 130 MM HG: ICD-10-PCS | Mod: CPTII,S$GLB,, | Performed by: FAMILY MEDICINE

## 2023-01-18 PROCEDURE — 3008F PR BODY MASS INDEX (BMI) DOCUMENTED: ICD-10-PCS | Mod: CPTII,S$GLB,, | Performed by: FAMILY MEDICINE

## 2023-01-18 PROCEDURE — 99999 PR PBB SHADOW E&M-EST. PATIENT-LVL III: ICD-10-PCS | Mod: PBBFAC,,, | Performed by: FAMILY MEDICINE

## 2023-01-18 PROCEDURE — 99213 PR OFFICE/OUTPT VISIT, EST, LEVL III, 20-29 MIN: ICD-10-PCS | Mod: S$GLB,,, | Performed by: FAMILY MEDICINE

## 2023-01-18 PROCEDURE — 3078F PR MOST RECENT DIASTOLIC BLOOD PRESSURE < 80 MM HG: ICD-10-PCS | Mod: CPTII,S$GLB,, | Performed by: FAMILY MEDICINE

## 2023-01-18 PROCEDURE — 1159F MED LIST DOCD IN RCRD: CPT | Mod: CPTII,S$GLB,, | Performed by: FAMILY MEDICINE

## 2023-01-18 RX ORDER — LISDEXAMFETAMINE DIMESYLATE 30 MG/1
30 CAPSULE ORAL EVERY MORNING
Qty: 30 CAPSULE | Refills: 0 | Status: SHIPPED | OUTPATIENT
Start: 2023-01-18 | End: 2023-01-19

## 2023-01-18 NOTE — PROGRESS NOTES
Subjective:       Patient ID: Jamshid Michaels is a 32 y.o. male.    Chief Complaint: Medication Refill (vyvanse)    Medication Refill  Pertinent negatives include no abdominal pain, chest pain, chills, coughing or fever.   Came in today for routine follow-up on ADHD medication.  Since the last visit we had to switch from 20 mg up to 30 mg due to medication shortage.  He has been doing fine on this without any negative effects.    All of your core healthy metrics are met.      Social History     Social History Narrative    Not on file       Family History   Problem Relation Age of Onset    No Known Problems Mother     Hypertension Father     Kidney disease Father         kidney donor    Diabetes Father     Thyroid cancer Maternal Aunt     Thyroid cancer Maternal Grandfather     Diabetes Paternal Uncle     Learning disabilities Maternal Grandmother         kidney stones       Current Outpatient Medications:     lisdexamfetamine (VYVANSE) 30 MG capsule, Take 1 capsule (30 mg total) by mouth every morning., Disp: 30 capsule, Rfl: 0    Review of Systems   Constitutional:  Negative for chills and fever.   Eyes:  Negative for visual disturbance.   Respiratory:  Negative for cough and shortness of breath.    Cardiovascular:  Negative for chest pain.   Gastrointestinal:  Negative for abdominal pain.   Neurological:  Negative for dizziness.     Objective:   /77   Pulse 81   Wt 89.2 kg (196 lb 9.6 oz)   SpO2 95%   BMI 29.03 kg/m²      Physical Exam  Vitals reviewed.   Constitutional:       Appearance: He is well-developed.   HENT:      Head: Normocephalic and atraumatic.   Eyes:      Conjunctiva/sclera: Conjunctivae normal.   Cardiovascular:      Rate and Rhythm: Normal rate.   Pulmonary:      Effort: Pulmonary effort is normal. No respiratory distress.   Skin:     General: Skin is warm and dry.      Findings: No rash.   Neurological:      Mental Status: He is alert and oriented to person, place, and time.       Coordination: Coordination normal.   Psychiatric:         Behavior: Behavior normal.       Assessment & Plan     Problem List Items Addressed This Visit          Psychiatric    ADHD, predominantly inattentive type - Primary    Current Assessment & Plan     Medication continues to be of benefit.  Sending refill with plan to follow-up every 3 months.              Immunizations Administered on Date of Encounter - 1/18/2023       No immunizations on file.             No follow-ups on file.      Disclaimer:  This note may have been prepared using voice recognition software, it may have not been extensively proofed, as such there could be errors within the text such as sound alike errors.

## 2023-01-19 ENCOUNTER — PATIENT MESSAGE (OUTPATIENT)
Dept: FAMILY MEDICINE | Facility: CLINIC | Age: 33
End: 2023-01-19
Payer: COMMERCIAL

## 2023-01-19 RX ORDER — LISDEXAMFETAMINE DIMESYLATE 40 MG/1
40 CAPSULE ORAL DAILY
Qty: 30 CAPSULE | Refills: 0 | Status: SHIPPED | OUTPATIENT
Start: 2023-01-19 | End: 2023-02-27 | Stop reason: SDUPTHER

## 2023-02-27 DIAGNOSIS — F90.0 ADHD, PREDOMINANTLY INATTENTIVE TYPE: Primary | ICD-10-CM

## 2023-02-27 RX ORDER — LISDEXAMFETAMINE DIMESYLATE 40 MG/1
40 CAPSULE ORAL DAILY
Qty: 30 CAPSULE | Refills: 0 | Status: SHIPPED | OUTPATIENT
Start: 2023-02-27 | End: 2023-05-02

## 2023-02-27 RX ORDER — LISDEXAMFETAMINE DIMESYLATE 40 MG/1
40 CAPSULE ORAL DAILY
Qty: 30 CAPSULE | Refills: 0 | Status: SHIPPED | OUTPATIENT
Start: 2023-03-24 | End: 2023-05-02

## 2023-02-27 NOTE — TELEPHONE ENCOUNTER
No new care gaps identified.  [facility] Embedded Care Gaps. Reference number: [id number]. 2/27/2023   9:20:00 AM CST

## 2023-05-02 ENCOUNTER — OFFICE VISIT (OUTPATIENT)
Dept: INTERNAL MEDICINE | Facility: CLINIC | Age: 33
End: 2023-05-02
Payer: COMMERCIAL

## 2023-05-02 VITALS
BODY MASS INDEX: 26.87 KG/M2 | HEIGHT: 69 IN | OXYGEN SATURATION: 98 % | DIASTOLIC BLOOD PRESSURE: 80 MMHG | WEIGHT: 181.44 LBS | SYSTOLIC BLOOD PRESSURE: 110 MMHG | HEART RATE: 102 BPM

## 2023-05-02 DIAGNOSIS — F90.0 ADHD, PREDOMINANTLY INATTENTIVE TYPE: ICD-10-CM

## 2023-05-02 PROCEDURE — 1159F MED LIST DOCD IN RCRD: CPT | Mod: CPTII,S$GLB,, | Performed by: FAMILY MEDICINE

## 2023-05-02 PROCEDURE — 3008F PR BODY MASS INDEX (BMI) DOCUMENTED: ICD-10-PCS | Mod: CPTII,S$GLB,, | Performed by: FAMILY MEDICINE

## 2023-05-02 PROCEDURE — 99999 PR PBB SHADOW E&M-EST. PATIENT-LVL III: CPT | Mod: PBBFAC,,, | Performed by: FAMILY MEDICINE

## 2023-05-02 PROCEDURE — 1159F PR MEDICATION LIST DOCUMENTED IN MEDICAL RECORD: ICD-10-PCS | Mod: CPTII,S$GLB,, | Performed by: FAMILY MEDICINE

## 2023-05-02 PROCEDURE — 3074F SYST BP LT 130 MM HG: CPT | Mod: CPTII,S$GLB,, | Performed by: FAMILY MEDICINE

## 2023-05-02 PROCEDURE — 99213 OFFICE O/P EST LOW 20 MIN: CPT | Mod: S$GLB,,, | Performed by: FAMILY MEDICINE

## 2023-05-02 PROCEDURE — 3008F BODY MASS INDEX DOCD: CPT | Mod: CPTII,S$GLB,, | Performed by: FAMILY MEDICINE

## 2023-05-02 PROCEDURE — 3074F PR MOST RECENT SYSTOLIC BLOOD PRESSURE < 130 MM HG: ICD-10-PCS | Mod: CPTII,S$GLB,, | Performed by: FAMILY MEDICINE

## 2023-05-02 PROCEDURE — 99213 PR OFFICE/OUTPT VISIT, EST, LEVL III, 20-29 MIN: ICD-10-PCS | Mod: S$GLB,,, | Performed by: FAMILY MEDICINE

## 2023-05-02 PROCEDURE — 3079F DIAST BP 80-89 MM HG: CPT | Mod: CPTII,S$GLB,, | Performed by: FAMILY MEDICINE

## 2023-05-02 PROCEDURE — 3079F PR MOST RECENT DIASTOLIC BLOOD PRESSURE 80-89 MM HG: ICD-10-PCS | Mod: CPTII,S$GLB,, | Performed by: FAMILY MEDICINE

## 2023-05-02 PROCEDURE — 99999 PR PBB SHADOW E&M-EST. PATIENT-LVL III: ICD-10-PCS | Mod: PBBFAC,,, | Performed by: FAMILY MEDICINE

## 2023-05-02 RX ORDER — LISDEXAMFETAMINE DIMESYLATE 30 MG/1
30 CAPSULE ORAL EVERY MORNING
Qty: 90 CAPSULE | Refills: 0 | Status: SHIPPED | OUTPATIENT
Start: 2023-05-02 | End: 2023-06-03 | Stop reason: SDUPTHER

## 2023-05-02 NOTE — PROGRESS NOTES
"Subjective:       Patient ID: Jamshid Michaels is a 33 y.o. male.    Chief Complaint: Medication Refill    HPI    F/u today for ADHD monitoring. Had to go up to 40 mg due to shortage a few months back. He feels like the 40 is a bit much and is hoping to decrease down to 30.     Otherwise doing well. Has been eating better and exercising. Lost 20 pounds intentionally.     All of your core healthy metrics are met.      Social History     Social History Narrative    Not on file       Family History   Problem Relation Age of Onset    No Known Problems Mother     Hypertension Father     Kidney disease Father         kidney donor    Diabetes Father     Thyroid cancer Maternal Aunt     Thyroid cancer Maternal Grandfather     Diabetes Paternal Uncle     Learning disabilities Maternal Grandmother         kidney stones       Current Outpatient Medications:     lisdexamfetamine (VYVANSE) 30 MG capsule, Take 1 capsule (30 mg total) by mouth every morning., Disp: 90 capsule, Rfl: 0    Review of Systems   Constitutional:  Negative for chills and fever.   Eyes:  Negative for visual disturbance.   Respiratory:  Negative for cough and shortness of breath.    Cardiovascular:  Negative for chest pain.   Gastrointestinal:  Negative for abdominal pain.   Neurological:  Negative for dizziness.     Objective:   /80 (BP Location: Left arm, Patient Position: Sitting, BP Method: Large (Manual))   Pulse 102   Ht 5' 9" (1.753 m)   Wt 82.3 kg (181 lb 7 oz)   SpO2 98%   BMI 26.79 kg/m²      Physical Exam  Vitals reviewed.   Constitutional:       Appearance: He is well-developed.   HENT:      Head: Normocephalic and atraumatic.   Eyes:      Conjunctiva/sclera: Conjunctivae normal.   Cardiovascular:      Rate and Rhythm: Normal rate.   Pulmonary:      Effort: Pulmonary effort is normal. No respiratory distress.   Skin:     General: Skin is warm and dry.      Findings: No rash.   Neurological:      Mental Status: He is alert and oriented " to person, place, and time.      Coordination: Coordination normal.   Psychiatric:         Behavior: Behavior normal.       Assessment & Plan     Problem List Items Addressed This Visit          Psychiatric    ADHD, predominantly inattentive type    Current Assessment & Plan     Doing well but will go back down to the 30mg.                Immunizations Administered on Date of Encounter - 5/2/2023       No immunizations on file.             No follow-ups on file.      Disclaimer:  This note may have been prepared using voice recognition software, it may have not been extensively proofed, as such there could be errors within the text such as sound alike errors.

## 2023-05-18 ENCOUNTER — OFFICE VISIT (OUTPATIENT)
Dept: URGENT CARE | Facility: CLINIC | Age: 33
End: 2023-05-18
Payer: COMMERCIAL

## 2023-05-18 VITALS
OXYGEN SATURATION: 98 % | HEART RATE: 74 BPM | HEIGHT: 69 IN | SYSTOLIC BLOOD PRESSURE: 122 MMHG | BODY MASS INDEX: 26.81 KG/M2 | WEIGHT: 181 LBS | DIASTOLIC BLOOD PRESSURE: 82 MMHG | TEMPERATURE: 98 F | RESPIRATION RATE: 19 BRPM

## 2023-05-18 DIAGNOSIS — L03.012 PARONYCHIA OF THUMB, LEFT: Primary | ICD-10-CM

## 2023-05-18 DIAGNOSIS — L03.012 CELLULITIS OF FINGER OF LEFT HAND: ICD-10-CM

## 2023-05-18 PROCEDURE — 99213 PR OFFICE/OUTPT VISIT, EST, LEVL III, 20-29 MIN: ICD-10-PCS | Mod: S$GLB,,, | Performed by: FAMILY MEDICINE

## 2023-05-18 PROCEDURE — 99213 OFFICE O/P EST LOW 20 MIN: CPT | Mod: S$GLB,,, | Performed by: FAMILY MEDICINE

## 2023-05-18 RX ORDER — CEPHALEXIN 500 MG/1
500 CAPSULE ORAL EVERY 8 HOURS
Qty: 21 CAPSULE | Refills: 0 | Status: SHIPPED | OUTPATIENT
Start: 2023-05-18 | End: 2023-05-25

## 2023-05-18 RX ORDER — MUPIROCIN 20 MG/G
OINTMENT TOPICAL
Qty: 15 G | Refills: 1 | Status: SHIPPED | OUTPATIENT
Start: 2023-05-18 | End: 2023-08-16

## 2023-05-18 NOTE — PROGRESS NOTES
"Subjective:      Patient ID: Jamshid Michaels is a 33 y.o. male.    Vitals:  height is 5' 9" (1.753 m) and weight is 82.1 kg (181 lb). His temperature is 98 °F (36.7 °C). His blood pressure is 122/82 and his pulse is 74. His respiration is 19 and oxygen saturation is 98%.     Chief Complaint: Nail Problem (Lt hand thumb )    Patient reports redness to left thumb close to nail since last night.  Reports mild pain.  Denies fever, chills, discharge.  States he tried to drain it by poking needle.  Reports no drainage by needle prick.    Nail Problem  This is a new problem. The current episode started yesterday. The problem occurs constantly. The problem has been rapidly worsening. Associated symptoms include joint swelling. Pertinent negatives include no abdominal pain, anorexia, chills, congestion, coughing, fatigue, fever, rash, sore throat, vomiting or weakness. Associated symptoms comments: Red and painful   . Nothing aggravates the symptoms. Treatments tried: draining it with a needle. The treatment provided no relief.     Constitution: Negative for chills, fatigue and fever.   HENT:  Negative for congestion and sore throat.    Respiratory:  Negative for cough.    Gastrointestinal:  Negative for abdominal pain and vomiting.   Musculoskeletal:  Positive for joint swelling.   Skin:  Positive for erythema. Negative for rash.    Objective:     Physical Exam   Constitutional: He is oriented to person, place, and time. He appears well-developed.   HENT:   Head: Normocephalic and atraumatic. Head is without abrasion, without contusion and without laceration.   Ears:   Right Ear: External ear normal.   Left Ear: External ear normal.   Nose: Nose normal.   Mouth/Throat: Oropharynx is clear and moist and mucous membranes are normal.   Eyes: Conjunctivae, EOM and lids are normal. Pupils are equal, round, and reactive to light.   Neck: Trachea normal and phonation normal. Neck supple.   Cardiovascular: Normal rate, regular " rhythm and normal heart sounds.   No murmur heard.  Pulmonary/Chest: Effort normal and breath sounds normal. No stridor. No respiratory distress. He has no wheezes. He has no rhonchi. He has no rales.   Musculoskeletal: Normal range of motion.         General: Normal range of motion.   Neurological: He is alert and oriented to person, place, and time.   Skin: Skin is warm, dry, intact and no rash. Capillary refill takes less than 2 seconds. erythema No abrasion, No burn, No bruising and No ecchymosis         Comments:   Left thumb:  Positive mild redness and mild swelling to distal phalanx.  No yellowish or greenish discoloration.  Positive localized tenderness.  No discharge.  .  Full ROM   Psychiatric: His speech is normal and behavior is normal. Judgment and thought content normal.   Nursing note and vitals reviewed.    Assessment:     1. Paronychia of thumb, left    2. Cellulitis of finger of left hand        Plan:   As the infection is in early stage, I&D is not required.  Warm compresses or soaking in warm water advised.  Antibiotic prescribed.  Advised to return to clinic if symptoms get any worse.    Paronychia of thumb, left    Cellulitis of finger of left hand    Other orders  -     cephALEXin (KEFLEX) 500 MG capsule; Take 1 capsule (500 mg total) by mouth every 8 (eight) hours. for 7 days  Dispense: 21 capsule; Refill: 0  -     mupirocin (BACTROBAN) 2 % ointment; Apply to affected area 3 times daily  Dispense: 15 g; Refill: 1

## 2023-06-03 NOTE — TELEPHONE ENCOUNTER
No care due was identified.  [facility] Embedded Care Due Messages. Reference number: [id number].   6/03/2023 11:16:58 AM CDT

## 2023-06-05 RX ORDER — LISDEXAMFETAMINE DIMESYLATE 30 MG/1
30 CAPSULE ORAL EVERY MORNING
Qty: 30 CAPSULE | Refills: 0 | Status: SHIPPED | OUTPATIENT
Start: 2023-07-06 | End: 2023-08-16

## 2023-06-05 RX ORDER — LISDEXAMFETAMINE DIMESYLATE 30 MG/1
30 CAPSULE ORAL EVERY MORNING
Qty: 30 CAPSULE | Refills: 0 | Status: SHIPPED | OUTPATIENT
Start: 2023-06-05 | End: 2023-08-16 | Stop reason: SDUPTHER

## 2023-08-16 ENCOUNTER — OFFICE VISIT (OUTPATIENT)
Dept: INTERNAL MEDICINE | Facility: CLINIC | Age: 33
End: 2023-08-16
Payer: COMMERCIAL

## 2023-08-16 VITALS
HEART RATE: 76 BPM | BODY MASS INDEX: 26.38 KG/M2 | SYSTOLIC BLOOD PRESSURE: 131 MMHG | HEIGHT: 69 IN | WEIGHT: 178.13 LBS | DIASTOLIC BLOOD PRESSURE: 80 MMHG | OXYGEN SATURATION: 97 %

## 2023-08-16 DIAGNOSIS — F90.0 ADHD, PREDOMINANTLY INATTENTIVE TYPE: Primary | ICD-10-CM

## 2023-08-16 PROCEDURE — 1159F MED LIST DOCD IN RCRD: CPT | Mod: CPTII,S$GLB,, | Performed by: FAMILY MEDICINE

## 2023-08-16 PROCEDURE — 99213 OFFICE O/P EST LOW 20 MIN: CPT | Mod: S$GLB,,, | Performed by: FAMILY MEDICINE

## 2023-08-16 PROCEDURE — 3079F DIAST BP 80-89 MM HG: CPT | Mod: CPTII,S$GLB,, | Performed by: FAMILY MEDICINE

## 2023-08-16 PROCEDURE — 3075F PR MOST RECENT SYSTOLIC BLOOD PRESS GE 130-139MM HG: ICD-10-PCS | Mod: CPTII,S$GLB,, | Performed by: FAMILY MEDICINE

## 2023-08-16 PROCEDURE — 3008F PR BODY MASS INDEX (BMI) DOCUMENTED: ICD-10-PCS | Mod: CPTII,S$GLB,, | Performed by: FAMILY MEDICINE

## 2023-08-16 PROCEDURE — 3008F BODY MASS INDEX DOCD: CPT | Mod: CPTII,S$GLB,, | Performed by: FAMILY MEDICINE

## 2023-08-16 PROCEDURE — 3075F SYST BP GE 130 - 139MM HG: CPT | Mod: CPTII,S$GLB,, | Performed by: FAMILY MEDICINE

## 2023-08-16 PROCEDURE — 1159F PR MEDICATION LIST DOCUMENTED IN MEDICAL RECORD: ICD-10-PCS | Mod: CPTII,S$GLB,, | Performed by: FAMILY MEDICINE

## 2023-08-16 PROCEDURE — 99999 PR PBB SHADOW E&M-EST. PATIENT-LVL III: CPT | Mod: PBBFAC,,, | Performed by: FAMILY MEDICINE

## 2023-08-16 PROCEDURE — 3079F PR MOST RECENT DIASTOLIC BLOOD PRESSURE 80-89 MM HG: ICD-10-PCS | Mod: CPTII,S$GLB,, | Performed by: FAMILY MEDICINE

## 2023-08-16 PROCEDURE — 99999 PR PBB SHADOW E&M-EST. PATIENT-LVL III: ICD-10-PCS | Mod: PBBFAC,,, | Performed by: FAMILY MEDICINE

## 2023-08-16 PROCEDURE — 99213 PR OFFICE/OUTPT VISIT, EST, LEVL III, 20-29 MIN: ICD-10-PCS | Mod: S$GLB,,, | Performed by: FAMILY MEDICINE

## 2023-08-16 RX ORDER — LISDEXAMFETAMINE DIMESYLATE 30 MG/1
30 CAPSULE ORAL EVERY MORNING
Qty: 30 CAPSULE | Refills: 0 | Status: SHIPPED | OUTPATIENT
Start: 2023-10-16 | End: 2023-11-14 | Stop reason: SDUPTHER

## 2023-08-16 RX ORDER — LISDEXAMFETAMINE DIMESYLATE 30 MG/1
30 CAPSULE ORAL EVERY MORNING
Qty: 30 CAPSULE | Refills: 0 | Status: SHIPPED | OUTPATIENT
Start: 2023-09-15 | End: 2023-10-15

## 2023-08-16 RX ORDER — LISDEXAMFETAMINE DIMESYLATE 30 MG/1
30 CAPSULE ORAL EVERY MORNING
Qty: 30 CAPSULE | Refills: 0 | Status: SHIPPED | OUTPATIENT
Start: 2023-08-16 | End: 2023-09-15

## 2023-08-16 NOTE — ASSESSMENT & PLAN NOTE
Doing well.  Sent the next 3 months worth of prescriptions for Vyvanse.  Will plan to do annual exam and labs at next visit.

## 2023-08-16 NOTE — PROGRESS NOTES
"Subjective:       Patient ID: Jamshid Michaels is a 33 y.o. male.    Chief Complaint: Medication Refill    HPI  Came in today for routine follow-up on Vyvanse prescription.  Continues to do well on the current dose.  Blood pressure is fine and he is not having any other adverse effects to the medication.      Continues to get healthier with consistent working out and has lost a little bit of weight.  Has also started eating more healthy as a result of this.    All of your core healthy metrics are met.      Social History     Social History Narrative    Not on file       Family History   Problem Relation Age of Onset    No Known Problems Mother     Hypertension Father     Kidney disease Father         kidney donor    Diabetes Father     Thyroid cancer Maternal Aunt     Thyroid cancer Maternal Grandfather     Diabetes Paternal Uncle     Learning disabilities Maternal Grandmother         kidney stones       Current Outpatient Medications:     [START ON 10/16/2023] lisdexamfetamine (VYVANSE) 30 MG capsule, Take 1 capsule (30 mg total) by mouth every morning., Disp: 30 capsule, Rfl: 0    [START ON 9/15/2023] lisdexamfetamine (VYVANSE) 30 MG capsule, Take 1 capsule (30 mg total) by mouth every morning., Disp: 30 capsule, Rfl: 0    lisdexamfetamine (VYVANSE) 30 MG capsule, Take 1 capsule (30 mg total) by mouth every morning., Disp: 30 capsule, Rfl: 0    Review of Systems   Eyes:  Negative for visual disturbance.   Respiratory:  Negative for shortness of breath.    Neurological:  Negative for dizziness.       Objective:   /80   Pulse 76   Ht 5' 9" (1.753 m)   Wt 80.8 kg (178 lb 2.1 oz)   SpO2 97%   BMI 26.31 kg/m²      Physical Exam  Vitals reviewed.   Constitutional:       Appearance: He is well-developed.   HENT:      Head: Normocephalic and atraumatic.   Eyes:      Conjunctiva/sclera: Conjunctivae normal.   Cardiovascular:      Rate and Rhythm: Normal rate.   Pulmonary:      Effort: Pulmonary effort is normal. " No respiratory distress.   Skin:     General: Skin is warm and dry.      Findings: No rash.   Neurological:      Mental Status: He is alert and oriented to person, place, and time.      Coordination: Coordination normal.   Psychiatric:         Behavior: Behavior normal.         Assessment & Plan     Problem List Items Addressed This Visit          Psychiatric    ADHD, predominantly inattentive type - Primary    Current Assessment & Plan     Doing well.  Sent the next 3 months worth of prescriptions for Vyvanse.  Will plan to do annual exam and labs at next visit.              Immunizations Administered on Date of Encounter - 8/16/2023       No immunizations on file.             No follow-ups on file.      Disclaimer:  This note may have been prepared using voice recognition software, it may have not been extensively proofed, as such there could be errors within the text such as sound alike errors.

## 2023-11-14 ENCOUNTER — OFFICE VISIT (OUTPATIENT)
Dept: INTERNAL MEDICINE | Facility: CLINIC | Age: 33
End: 2023-11-14
Payer: COMMERCIAL

## 2023-11-14 VITALS
DIASTOLIC BLOOD PRESSURE: 77 MMHG | BODY MASS INDEX: 26.64 KG/M2 | SYSTOLIC BLOOD PRESSURE: 135 MMHG | HEIGHT: 69 IN | WEIGHT: 179.88 LBS | HEART RATE: 81 BPM | OXYGEN SATURATION: 97 %

## 2023-11-14 DIAGNOSIS — Z00.00 ROUTINE HEALTH MAINTENANCE: Primary | ICD-10-CM

## 2023-11-14 DIAGNOSIS — Z23 NEED FOR VACCINATION: ICD-10-CM

## 2023-11-14 PROCEDURE — G0008 TDAP VACCINE GREATER THAN OR EQUAL TO 7YO IM: ICD-10-PCS | Mod: 59,S$GLB,, | Performed by: FAMILY MEDICINE

## 2023-11-14 PROCEDURE — 3075F SYST BP GE 130 - 139MM HG: CPT | Mod: CPTII,S$GLB,, | Performed by: FAMILY MEDICINE

## 2023-11-14 PROCEDURE — 1159F MED LIST DOCD IN RCRD: CPT | Mod: CPTII,S$GLB,, | Performed by: FAMILY MEDICINE

## 2023-11-14 PROCEDURE — 99395 PREV VISIT EST AGE 18-39: CPT | Mod: 25,S$GLB,, | Performed by: FAMILY MEDICINE

## 2023-11-14 PROCEDURE — 3008F PR BODY MASS INDEX (BMI) DOCUMENTED: ICD-10-PCS | Mod: CPTII,S$GLB,, | Performed by: FAMILY MEDICINE

## 2023-11-14 PROCEDURE — 90686 FLU VACCINE (QUAD) GREATER THAN OR EQUAL TO 3YO PRESERVATIVE FREE IM: ICD-10-PCS | Mod: S$GLB,,, | Performed by: FAMILY MEDICINE

## 2023-11-14 PROCEDURE — 99999 PR PBB SHADOW E&M-EST. PATIENT-LVL III: ICD-10-PCS | Mod: PBBFAC,,, | Performed by: FAMILY MEDICINE

## 2023-11-14 PROCEDURE — 3008F BODY MASS INDEX DOCD: CPT | Mod: CPTII,S$GLB,, | Performed by: FAMILY MEDICINE

## 2023-11-14 PROCEDURE — 90471 FLU VACCINE (QUAD) GREATER THAN OR EQUAL TO 3YO PRESERVATIVE FREE IM: ICD-10-PCS | Mod: S$GLB,,, | Performed by: FAMILY MEDICINE

## 2023-11-14 PROCEDURE — 90686 IIV4 VACC NO PRSV 0.5 ML IM: CPT | Mod: S$GLB,,, | Performed by: FAMILY MEDICINE

## 2023-11-14 PROCEDURE — 90471 IMMUNIZATION ADMIN: CPT | Mod: S$GLB,,, | Performed by: FAMILY MEDICINE

## 2023-11-14 PROCEDURE — 99999 PR PBB SHADOW E&M-EST. PATIENT-LVL III: CPT | Mod: PBBFAC,,, | Performed by: FAMILY MEDICINE

## 2023-11-14 PROCEDURE — G0008 ADMIN INFLUENZA VIRUS VAC: HCPCS | Mod: 59,S$GLB,, | Performed by: FAMILY MEDICINE

## 2023-11-14 PROCEDURE — 3078F DIAST BP <80 MM HG: CPT | Mod: CPTII,S$GLB,, | Performed by: FAMILY MEDICINE

## 2023-11-14 PROCEDURE — 1159F PR MEDICATION LIST DOCUMENTED IN MEDICAL RECORD: ICD-10-PCS | Mod: CPTII,S$GLB,, | Performed by: FAMILY MEDICINE

## 2023-11-14 PROCEDURE — 99395 PR PREVENTIVE VISIT,EST,18-39: ICD-10-PCS | Mod: 25,S$GLB,, | Performed by: FAMILY MEDICINE

## 2023-11-14 PROCEDURE — 90715 TDAP VACCINE GREATER THAN OR EQUAL TO 7YO IM: ICD-10-PCS | Mod: S$GLB,,, | Performed by: FAMILY MEDICINE

## 2023-11-14 PROCEDURE — 3078F PR MOST RECENT DIASTOLIC BLOOD PRESSURE < 80 MM HG: ICD-10-PCS | Mod: CPTII,S$GLB,, | Performed by: FAMILY MEDICINE

## 2023-11-14 PROCEDURE — 3075F PR MOST RECENT SYSTOLIC BLOOD PRESS GE 130-139MM HG: ICD-10-PCS | Mod: CPTII,S$GLB,, | Performed by: FAMILY MEDICINE

## 2023-11-14 PROCEDURE — 90715 TDAP VACCINE 7 YRS/> IM: CPT | Mod: S$GLB,,, | Performed by: FAMILY MEDICINE

## 2023-11-14 RX ORDER — LISDEXAMFETAMINE DIMESYLATE 30 MG/1
30 CAPSULE ORAL EVERY MORNING
Qty: 30 CAPSULE | Refills: 0 | Status: SHIPPED | OUTPATIENT
Start: 2023-11-14 | End: 2023-12-15 | Stop reason: SDUPTHER

## 2023-11-14 NOTE — PROGRESS NOTES
"Subjective:       Patient ID: Jamshid Michaels is a 33 y.o. male.    Chief Complaint: Medication Refill    Medication Refill  Pertinent negatives include no abdominal pain, chest pain, chills, coughing or fever.     Came in today for routine annual exam and checking for ADHD.  He is due for flu vaccine and Tdap.  Has no new concerns at this time.  Continues to take Vyvanse at a low dose and does well on this medication.    All of your core healthy metrics are met.      Social History     Social History Narrative    Not on file       Family History   Problem Relation Age of Onset    No Known Problems Mother     Hypertension Father     Kidney disease Father         kidney donor    Diabetes Father     Thyroid cancer Maternal Aunt     Thyroid cancer Maternal Grandfather     Diabetes Paternal Uncle     Learning disabilities Maternal Grandmother         kidney stones       Current Outpatient Medications:     lisdexamfetamine (VYVANSE) 30 MG capsule, Take 1 capsule (30 mg total) by mouth every morning., Disp: 30 capsule, Rfl: 0    Review of Systems   Constitutional:  Negative for chills and fever.   Eyes:  Negative for visual disturbance.   Respiratory:  Negative for cough and shortness of breath.    Cardiovascular:  Negative for chest pain.   Gastrointestinal:  Negative for abdominal pain.   Neurological:  Negative for dizziness.       Objective:   /77 (BP Location: Right arm, Patient Position: Sitting, BP Method: Large (Automatic))   Pulse 81   Ht 5' 9" (1.753 m)   Wt 81.6 kg (179 lb 14.3 oz)   SpO2 97%   BMI 26.57 kg/m²      Physical Exam  Vitals reviewed.   Constitutional:       Appearance: He is well-developed.   HENT:      Head: Normocephalic and atraumatic.   Eyes:      Conjunctiva/sclera: Conjunctivae normal.   Cardiovascular:      Rate and Rhythm: Normal rate.   Pulmonary:      Effort: Pulmonary effort is normal. No respiratory distress.   Skin:     General: Skin is warm and dry.      Findings: No rash. "   Neurological:      Mental Status: He is alert and oriented to person, place, and time.      Coordination: Coordination normal.   Psychiatric:         Behavior: Behavior normal.         Assessment & Plan     Problem List Items Addressed This Visit          Other    Routine health maintenance - Primary    Current Assessment & Plan     No concerns from history nor physical exam. Getting routine labs done today. Encouraged healthy diet and routine exercise.                 Immunizations Administered on Date of Encounter - 11/14/2023       Name Date Dose VIS Date Route Exp Date    TDAP 11/14/2023  3:11 PM 0.5 mL 8/6/2021 Intramuscular 12/31/2025    Site: Right deltoid     Given By: Mandi Garrido LPN     : [facility]     Lot: [id number]              No follow-ups on file.      Disclaimer:  This note may have been prepared using voice recognition software, it may have not been extensively proofed, as such there could be errors within the text such as sound alike errors.

## 2023-12-15 ENCOUNTER — PATIENT MESSAGE (OUTPATIENT)
Dept: INTERNAL MEDICINE | Facility: CLINIC | Age: 33
End: 2023-12-15
Payer: COMMERCIAL

## 2023-12-15 RX ORDER — LISDEXAMFETAMINE DIMESYLATE 30 MG/1
30 CAPSULE ORAL EVERY MORNING
Qty: 30 CAPSULE | Refills: 0 | Status: CANCELLED | OUTPATIENT
Start: 2023-12-15

## 2023-12-15 RX ORDER — LISDEXAMFETAMINE DIMESYLATE 30 MG/1
30 CAPSULE ORAL EVERY MORNING
Qty: 30 CAPSULE | Refills: 0 | Status: SHIPPED | OUTPATIENT
Start: 2023-12-15 | End: 2024-01-25 | Stop reason: SDUPTHER

## 2023-12-15 NOTE — TELEPHONE ENCOUNTER
No care due was identified.  Health [facility] Embedded Care Due Messages. Reference number: [id number].   12/15/2023 10:43:57 AM CST

## 2023-12-15 NOTE — TELEPHONE ENCOUNTER
No care due was identified.  [facility] Embedded Care Due Messages. Reference number: [id number].   12/15/2023 9:21:01 AM CST

## 2024-01-24 ENCOUNTER — PATIENT MESSAGE (OUTPATIENT)
Dept: INTERNAL MEDICINE | Facility: CLINIC | Age: 34
End: 2024-01-24
Payer: COMMERCIAL

## 2024-01-25 RX ORDER — LISDEXAMFETAMINE DIMESYLATE 30 MG/1
30 CAPSULE ORAL EVERY MORNING
Qty: 30 CAPSULE | Refills: 0 | Status: SHIPPED | OUTPATIENT
Start: 2024-01-25 | End: 2024-02-20 | Stop reason: SDUPTHER

## 2024-01-25 NOTE — TELEPHONE ENCOUNTER
No care due was identified.  Health [facility] Embedded Care Due Messages. Reference number: [id number].   1/25/2024 10:09:14 AM CST

## 2024-02-19 PROBLEM — Z00.00 ROUTINE HEALTH MAINTENANCE: Status: RESOLVED | Noted: 2023-11-14 | Resolved: 2024-02-19

## 2024-02-20 RX ORDER — LISDEXAMFETAMINE DIMESYLATE 30 MG/1
30 CAPSULE ORAL EVERY MORNING
Qty: 30 CAPSULE | Refills: 0 | Status: SHIPPED | OUTPATIENT
Start: 2024-02-20 | End: 2024-04-01 | Stop reason: SDUPTHER

## 2024-02-20 NOTE — TELEPHONE ENCOUNTER
Spoke with pt stating message below:      Secure Chat  7:30am    Kaden Bates NP     can you please call the 1100 and verify what they are coming in for? if it is ADD prescription renewal I can not see them for that. they need to see the physician. also, they do not need a follow up for that yet, it is every 6 months.      Appt cancelled and refill request sent to PCP.      LOV 11/14/2023;allergies confirmed

## 2024-02-22 ENCOUNTER — OFFICE VISIT (OUTPATIENT)
Dept: INTERNAL MEDICINE | Facility: CLINIC | Age: 34
End: 2024-02-22
Payer: COMMERCIAL

## 2024-02-22 VITALS
HEIGHT: 69 IN | SYSTOLIC BLOOD PRESSURE: 125 MMHG | BODY MASS INDEX: 25.01 KG/M2 | HEART RATE: 77 BPM | DIASTOLIC BLOOD PRESSURE: 71 MMHG | OXYGEN SATURATION: 98 % | WEIGHT: 168.88 LBS

## 2024-02-22 DIAGNOSIS — F90.0 ADHD, PREDOMINANTLY INATTENTIVE TYPE: Primary | ICD-10-CM

## 2024-02-22 PROCEDURE — 99213 OFFICE O/P EST LOW 20 MIN: CPT | Mod: S$GLB,,, | Performed by: FAMILY MEDICINE

## 2024-02-22 PROCEDURE — 99999 PR PBB SHADOW E&M-EST. PATIENT-LVL III: CPT | Mod: PBBFAC,,, | Performed by: FAMILY MEDICINE

## 2024-02-22 NOTE — PROGRESS NOTES
"Primary Care     CC:Attention Deficit Disorder  HPI:f/u for ADHD. Doing well on vyvanse. No concerns.   Continues to do well and has been exercising. Lost more weight.     Problem List Items Addressed This Visit    None      The patient's Health Maintenance was reviewed and the following appears to be due at this time:  Health Maintenance Due   Topic Date Due    Hepatitis C Screening  Never done    HIV Screening  Never done    COVID-19 Vaccine (3 - 2023-24 season) 09/01/2023       [unfilled]  Outpatient Medications Prior to Visit   Medication Sig Dispense Refill    lisdexamfetamine (VYVANSE) 30 MG capsule Take 1 capsule (30 mg total) by mouth every morning. 30 capsule 0     No facility-administered medications prior to visit.      ROS   Physical Exam        No data to display                   No data to display                  /71 (BP Location: Left arm, Patient Position: Sitting)   Pulse 77   Ht 5' 9" (1.753 m)   Wt 76.6 kg (168 lb 14 oz)   SpO2 98%   BMI 24.94 kg/m²     Constitutional: The patient appears well-developed and well-nourished. No distress.   Psychiatric: The mood appears not anxious and the affect is not angry, not blunt, not labile and not inappropriate. Speech is not rapid and/or pressured, not delayed, not tangential and not slurred. The patient is not agitated, not aggressive, is not hyperactive, not slowed, not withdrawn, not actively hallucinating and not combative. Thought content is not paranoid and not delusional. Cognition and memory are not impaired. The patient does not express impulsivity or inappropriate judgment and does not exhibit a depressed mood. The patient expresses no homicidal and no suicidal ideation and has no suicidal plans and no homicidal plans. The patient is communicative and exhibits a normal recent memory and normal remote memory. The patient is attentive.     No diagnosis found.    PLAN:    I am having Jamshid Michaels maintain his lisdexamfetamine.  No " problem-specific Assessment & Plan notes found for this encounter.      Follow up in about 6 months (around 8/22/2024).    There are no diagnoses linked to this encounter.     [unfilled]  No orders of the defined types were placed in this encounter.      Medication List with Changes/Refills   Current Medications    LISDEXAMFETAMINE (VYVANSE) 30 MG CAPSULE    Take 1 capsule (30 mg total) by mouth every morning.      Medication List with Changes/Refills   Current Medications    LISDEXAMFETAMINE (VYVANSE) 30 MG CAPSULE    Take 1 capsule (30 mg total) by mouth every morning.       Start Date: 2/20/2024 End Date: --

## 2024-04-01 NOTE — TELEPHONE ENCOUNTER
No care due was identified.  Health [facility] Embedded Care Due Messages. Reference number: [id number].   4/01/2024 6:38:23 PM CDT

## 2024-04-02 RX ORDER — LISDEXAMFETAMINE DIMESYLATE 30 MG/1
30 CAPSULE ORAL EVERY MORNING
Qty: 30 CAPSULE | Refills: 0 | Status: SHIPPED | OUTPATIENT
Start: 2024-04-02 | End: 2024-05-02 | Stop reason: SDUPTHER

## 2024-05-02 NOTE — TELEPHONE ENCOUNTER
No care due was identified.  Health [facility] Embedded Care Due Messages. Reference number: [id number].   5/02/2024 4:35:27 PM CDT

## 2024-05-03 RX ORDER — LISDEXAMFETAMINE DIMESYLATE 30 MG/1
30 CAPSULE ORAL EVERY MORNING
Qty: 30 CAPSULE | Refills: 0 | Status: SHIPPED | OUTPATIENT
Start: 2024-05-03 | End: 2024-06-03 | Stop reason: SDUPTHER

## 2024-06-03 DIAGNOSIS — F90.0 ADHD, PREDOMINANTLY INATTENTIVE TYPE: Primary | ICD-10-CM

## 2024-06-03 RX ORDER — LISDEXAMFETAMINE DIMESYLATE 30 MG/1
30 CAPSULE ORAL EVERY MORNING
Qty: 30 CAPSULE | Refills: 0 | Status: SHIPPED | OUTPATIENT
Start: 2024-06-03

## 2024-06-03 NOTE — TELEPHONE ENCOUNTER
No care due was identified.  [facility] Embedded Care Due Messages. Reference number: [id number].   6/03/2024 9:57:27 AM CDT

## 2024-06-10 ENCOUNTER — PATIENT MESSAGE (OUTPATIENT)
Dept: INTERNAL MEDICINE | Facility: CLINIC | Age: 34
End: 2024-06-10
Payer: COMMERCIAL

## 2024-07-02 DIAGNOSIS — F90.0 ADHD, PREDOMINANTLY INATTENTIVE TYPE: ICD-10-CM

## 2024-07-02 RX ORDER — LISDEXAMFETAMINE DIMESYLATE 30 MG/1
30 CAPSULE ORAL EVERY MORNING
Qty: 30 CAPSULE | Refills: 0 | Status: SHIPPED | OUTPATIENT
Start: 2024-07-02

## 2024-07-02 NOTE — TELEPHONE ENCOUNTER
No care due was identified.  [facility] Embedded Care Due Messages. Reference number: [id number].   7/02/2024 8:32:32 AM CDT

## 2024-08-01 DIAGNOSIS — F90.0 ADHD, PREDOMINANTLY INATTENTIVE TYPE: ICD-10-CM

## 2024-08-03 ENCOUNTER — PATIENT MESSAGE (OUTPATIENT)
Dept: INTERNAL MEDICINE | Facility: CLINIC | Age: 34
End: 2024-08-03
Payer: COMMERCIAL

## 2024-08-03 DIAGNOSIS — F90.0 ADHD, PREDOMINANTLY INATTENTIVE TYPE: ICD-10-CM

## 2024-08-03 RX ORDER — LISDEXAMFETAMINE DIMESYLATE 30 MG/1
30 CAPSULE ORAL EVERY MORNING
Qty: 30 CAPSULE | Refills: 0 | Status: SHIPPED | OUTPATIENT
Start: 2024-08-03 | End: 2024-08-05 | Stop reason: SDUPTHER

## 2024-08-05 RX ORDER — LISDEXAMFETAMINE DIMESYLATE 30 MG/1
30 CAPSULE ORAL EVERY MORNING
Qty: 30 CAPSULE | Refills: 0 | Status: SHIPPED | OUTPATIENT
Start: 2024-08-05

## 2024-09-05 DIAGNOSIS — F90.0 ADHD, PREDOMINANTLY INATTENTIVE TYPE: ICD-10-CM

## 2024-09-06 ENCOUNTER — E-VISIT (OUTPATIENT)
Dept: INTERNAL MEDICINE | Facility: CLINIC | Age: 34
End: 2024-09-06
Payer: COMMERCIAL

## 2024-09-06 DIAGNOSIS — F90.0 ADHD, PREDOMINANTLY INATTENTIVE TYPE: Primary | ICD-10-CM

## 2024-09-06 PROCEDURE — 99499 UNLISTED E&M SERVICE: CPT | Mod: 95,,, | Performed by: FAMILY MEDICINE

## 2024-09-06 RX ORDER — LISDEXAMFETAMINE DIMESYLATE 30 MG/1
30 CAPSULE ORAL EVERY MORNING
Qty: 30 CAPSULE | Refills: 0 | Status: SHIPPED | OUTPATIENT
Start: 2024-09-06

## 2024-09-06 NOTE — TELEPHONE ENCOUNTER
No care due was identified.  [facility] Embedded Care Due Messages. Reference number: [id number].   9/05/2024 9:08:01 PM CDT

## 2024-09-06 NOTE — TELEPHONE ENCOUNTER
LOV 2/24/2024 Allergies confirmed.      Psychiatry: Stimulants/ADHD [id number]/05/2024 09:07 PM   Protocol Details Valid encounter within last 3 months    Matches previous order    Manual Review: Verify controlled substance

## 2024-10-08 DIAGNOSIS — F90.0 ADHD, PREDOMINANTLY INATTENTIVE TYPE: ICD-10-CM

## 2024-10-08 NOTE — TELEPHONE ENCOUNTER
No care due was identified.  [facility] Embedded Care Due Messages. Reference number: [id number].   10/08/2024 4:15:32 PM CDT

## 2024-10-09 RX ORDER — LISDEXAMFETAMINE DIMESYLATE 30 MG/1
30 CAPSULE ORAL EVERY MORNING
Qty: 30 CAPSULE | Refills: 0 | Status: SHIPPED | OUTPATIENT
Start: 2024-10-09

## 2024-11-14 DIAGNOSIS — F90.0 ADHD, PREDOMINANTLY INATTENTIVE TYPE: ICD-10-CM

## 2024-11-14 RX ORDER — LISDEXAMFETAMINE DIMESYLATE 30 MG/1
30 CAPSULE ORAL EVERY MORNING
Qty: 30 CAPSULE | Refills: 0 | Status: SHIPPED | OUTPATIENT
Start: 2024-11-14

## 2024-11-14 NOTE — TELEPHONE ENCOUNTER
No care due was identified.  [facility] Embedded Care Due Messages. Reference number: [id number].   11/14/2024 11:55:28 AM CST

## 2024-12-12 DIAGNOSIS — F90.0 ADHD, PREDOMINANTLY INATTENTIVE TYPE: ICD-10-CM

## 2024-12-12 RX ORDER — LISDEXAMFETAMINE DIMESYLATE 30 MG/1
30 CAPSULE ORAL EVERY MORNING
Qty: 30 CAPSULE | Refills: 0 | Status: SHIPPED | OUTPATIENT
Start: 2024-12-12

## 2024-12-12 NOTE — TELEPHONE ENCOUNTER
Refill Encounter    PCP Visits: Recent Visits  Date Type Provider Dept   02/22/24 Office Visit Randy, Misael SHOEMAKER DO [facility] Internal Medicine   Showing recent visits within past 360 days and meeting all other requirements  Future Appointments  No visits were found meeting these conditions.  Showing future appointments within next 720 days and meeting all other requirements     Last 3 Blood Pressure:   BP Readings from Last 3 Encounters:   02/22/24 125/71   11/14/23 135/77   08/16/23 131/80     Preferred Pharmacy:   [id number] - [place] - [address] AT [facility] [address]  [address] [address]  Phone: [phone] Fax: [phone]    Requested RX:  Requested Prescriptions     Pending Prescriptions Disp Refills    lisdexamfetamine (VYVANSE) 30 MG capsule 30 capsule 0     Sig: Take 1 capsule (30 mg total) by mouth every morning.      RX Route: Normal

## 2025-01-15 ENCOUNTER — LAB VISIT (OUTPATIENT)
Dept: LAB | Facility: OTHER | Age: 35
End: 2025-01-15
Attending: FAMILY MEDICINE
Payer: COMMERCIAL

## 2025-01-15 ENCOUNTER — OFFICE VISIT (OUTPATIENT)
Dept: INTERNAL MEDICINE | Facility: CLINIC | Age: 35
End: 2025-01-15
Payer: COMMERCIAL

## 2025-01-15 VITALS
HEART RATE: 70 BPM | DIASTOLIC BLOOD PRESSURE: 68 MMHG | BODY MASS INDEX: 26.35 KG/M2 | OXYGEN SATURATION: 98 % | WEIGHT: 177.94 LBS | SYSTOLIC BLOOD PRESSURE: 120 MMHG | HEIGHT: 69 IN

## 2025-01-15 DIAGNOSIS — Z00.00 ROUTINE HEALTH MAINTENANCE: ICD-10-CM

## 2025-01-15 DIAGNOSIS — F90.0 ADHD, PREDOMINANTLY INATTENTIVE TYPE: ICD-10-CM

## 2025-01-15 DIAGNOSIS — Z00.00 ROUTINE HEALTH MAINTENANCE: Primary | ICD-10-CM

## 2025-01-15 LAB
ALBUMIN SERPL BCP-MCNC: 4.7 G/DL (ref 3.5–5.2)
ALP SERPL-CCNC: 60 U/L (ref 40–150)
ALT SERPL W/O P-5'-P-CCNC: 32 U/L (ref 10–44)
ANION GAP SERPL CALC-SCNC: 13 MMOL/L (ref 8–16)
AST SERPL-CCNC: 28 U/L (ref 10–40)
BILIRUB SERPL-MCNC: 1 MG/DL (ref 0.1–1)
BUN SERPL-MCNC: 12 MG/DL (ref 6–20)
CALCIUM SERPL-MCNC: 9.7 MG/DL (ref 8.7–10.5)
CHLORIDE SERPL-SCNC: 105 MMOL/L (ref 95–110)
CHOLEST SERPL-MCNC: 188 MG/DL (ref 120–199)
CHOLEST/HDLC SERPL: 3.6 {RATIO} (ref 2–5)
CO2 SERPL-SCNC: 24 MMOL/L (ref 23–29)
CREAT SERPL-MCNC: 1.3 MG/DL (ref 0.5–1.4)
ERYTHROCYTE [DISTWIDTH] IN BLOOD BY AUTOMATED COUNT: 12.5 % (ref 11.5–14.5)
EST. GFR  (NO RACE VARIABLE): >60 ML/MIN/1.73 M^2
GLUCOSE SERPL-MCNC: 85 MG/DL (ref 70–110)
HCT VFR BLD AUTO: 45.1 % (ref 40–54)
HCV AB SERPL QL IA: NEGATIVE
HDLC SERPL-MCNC: 52 MG/DL (ref 40–75)
HDLC SERPL: 27.7 % (ref 20–50)
HGB BLD-MCNC: 15.2 G/DL (ref 14–18)
HIV 1+2 AB+HIV1 P24 AG SERPL QL IA: NEGATIVE
LDLC SERPL CALC-MCNC: 115.4 MG/DL (ref 63–159)
MCH RBC QN AUTO: 32.1 PG (ref 27–31)
MCHC RBC AUTO-ENTMCNC: 33.7 G/DL (ref 32–36)
MCV RBC AUTO: 95 FL (ref 82–98)
NONHDLC SERPL-MCNC: 136 MG/DL
PLATELET # BLD AUTO: 297 K/UL (ref 150–450)
PMV BLD AUTO: 9.8 FL (ref 9.2–12.9)
POTASSIUM SERPL-SCNC: 4.3 MMOL/L (ref 3.5–5.1)
PROT SERPL-MCNC: 7.9 G/DL (ref 6–8.4)
RBC # BLD AUTO: 4.74 M/UL (ref 4.6–6.2)
SODIUM SERPL-SCNC: 142 MMOL/L (ref 136–145)
TRIGL SERPL-MCNC: 103 MG/DL (ref 30–150)
WBC # BLD AUTO: 6.3 K/UL (ref 3.9–12.7)

## 2025-01-15 PROCEDURE — 3074F SYST BP LT 130 MM HG: CPT | Mod: CPTII,S$GLB,, | Performed by: FAMILY MEDICINE

## 2025-01-15 PROCEDURE — 99999 PR PBB SHADOW E&M-EST. PATIENT-LVL III: CPT | Mod: PBBFAC,,, | Performed by: FAMILY MEDICINE

## 2025-01-15 PROCEDURE — 87389 HIV-1 AG W/HIV-1&-2 AB AG IA: CPT | Performed by: FAMILY MEDICINE

## 2025-01-15 PROCEDURE — 85027 COMPLETE CBC AUTOMATED: CPT | Performed by: FAMILY MEDICINE

## 2025-01-15 PROCEDURE — 3008F BODY MASS INDEX DOCD: CPT | Mod: CPTII,S$GLB,, | Performed by: FAMILY MEDICINE

## 2025-01-15 PROCEDURE — 1159F MED LIST DOCD IN RCRD: CPT | Mod: CPTII,S$GLB,, | Performed by: FAMILY MEDICINE

## 2025-01-15 PROCEDURE — 3078F DIAST BP <80 MM HG: CPT | Mod: CPTII,S$GLB,, | Performed by: FAMILY MEDICINE

## 2025-01-15 PROCEDURE — 80053 COMPREHEN METABOLIC PANEL: CPT | Performed by: FAMILY MEDICINE

## 2025-01-15 PROCEDURE — 99395 PREV VISIT EST AGE 18-39: CPT | Mod: S$GLB,,, | Performed by: FAMILY MEDICINE

## 2025-01-15 PROCEDURE — 36415 COLL VENOUS BLD VENIPUNCTURE: CPT | Performed by: FAMILY MEDICINE

## 2025-01-15 PROCEDURE — 86803 HEPATITIS C AB TEST: CPT | Performed by: FAMILY MEDICINE

## 2025-01-15 PROCEDURE — 80061 LIPID PANEL: CPT | Performed by: FAMILY MEDICINE

## 2025-01-15 RX ORDER — LISDEXAMFETAMINE DIMESYLATE 30 MG/1
30 CAPSULE ORAL EVERY MORNING
Qty: 30 CAPSULE | Refills: 0 | Status: CANCELLED | OUTPATIENT
Start: 2025-01-15

## 2025-01-16 NOTE — TELEPHONE ENCOUNTER
No care due was identified.  [facility] Embedded Care Due Messages. Reference number: [id number].   1/15/2025 6:31:01 PM CST

## 2025-01-16 NOTE — TELEPHONE ENCOUNTER
Refill Encounter    PCP Visits: Recent Visits  Date Type Provider Dept   01/15/25 Office Visit WeeksMisael DO [facility] Internal Medicine   02/22/24 Office Visit Weeks, Misael SHOEMAKER,  [facility] Internal Medicine   Showing recent visits within past 360 days and meeting all other requirements  Future Appointments  No visits were found meeting these conditions.  Showing future appointments within next 720 days and meeting all other requirements     Last 3 Blood Pressure:   BP Readings from Last 3 Encounters:   01/15/25 120/68   02/22/24 125/71   11/14/23 135/77     Preferred Pharmacy:   [id number] - [place] - [address] AT [facility] [address] [address]  [address] [address]  Phone: [phone] Fax: [phone]    Requested RX:  Requested Prescriptions     Pending Prescriptions Disp Refills    lisdexamfetamine (VYVANSE) 30 MG capsule 30 capsule 0     Sig: Take 1 capsule (30 mg total) by mouth every morning.      RX Route: Normal

## 2025-01-21 RX ORDER — LISDEXAMFETAMINE DIMESYLATE 30 MG/1
30 CAPSULE ORAL DAILY
Qty: 30 CAPSULE | Refills: 0 | Status: SHIPPED | OUTPATIENT
Start: 2025-01-21 | End: 2025-02-20

## 2025-01-21 RX ORDER — LISDEXAMFETAMINE DIMESYLATE 30 MG/1
30 CAPSULE ORAL DAILY
Qty: 30 CAPSULE | Refills: 0 | Status: SHIPPED | OUTPATIENT
Start: 2025-02-21 | End: 2025-03-23

## 2025-01-21 RX ORDER — LISDEXAMFETAMINE DIMESYLATE 30 MG/1
30 CAPSULE ORAL DAILY
Qty: 30 CAPSULE | Refills: 0 | Status: SHIPPED | OUTPATIENT
Start: 2025-03-24 | End: 2025-04-23

## 2025-01-21 NOTE — PROGRESS NOTES
Subjective:       Patient ID: Jamshid Michaels is a 34 y.o. male.    Chief Complaint: Annual Exam    HPI  History of Present Illness    CHIEF COMPLAINT:  Jamshid presents today for follow-up.    ENT:  He reports a habit of compulsively picking at his ears and frequently removing earwax. He uses a modified paper clip with an L-shaped hook, acknowledging this practice may cause irritation.    LABS:  Cholesterol check in 2021 showed excellent results.    IMMUNIZATIONS:  He declines flu vaccine due to concerns about arm soreness.      ROS:  General: -fever, -chills, -fatigue, -weight gain, -weight loss  Eyes: -vision changes, -redness, -discharge  ENT: -ear pain, -nasal congestion, -sore throat  Cardiovascular: -chest pain, -palpitations, -lower extremity edema  Respiratory: -cough, -shortness of breath  Gastrointestinal: -abdominal pain, -nausea, -vomiting, -diarrhea, -constipation, -blood in stool  Genitourinary: -dysuria, -hematuria, -frequency  Musculoskeletal: -joint pain, -muscle pain  Skin: -rash, -lesion  Neurological: -headache, -dizziness, -numbness, -tingling  Psychiatric: -anxiety, -depression, -sleep difficulty           All of your core healthy metrics are met.      Social History     Social History Narrative    Not on file       Family History   Problem Relation Name Age of Onset    No Known Problems Mother      Hypertension Father Azael Michaels     Kidney disease Father Azael Michaels         kidney donor    Diabetes Father Azael Michaels     Thyroid cancer Maternal Aunt      Thyroid cancer Maternal Grandfather      Diabetes Paternal Uncle Oldest     Learning disabilities Maternal Grandmother Oldest         kidney stones       Current Outpatient Medications:     lisdexamfetamine (VYVANSE) 30 MG capsule, Take 1 capsule (30 mg total) by mouth once daily., Disp: 30 capsule, Rfl: 0    [START ON 2/21/2025] lisdexamfetamine (VYVANSE) 30 MG capsule, Take 1 capsule (30 mg total) by mouth once daily., Disp: 30 capsule,  "Rfl: 0    [START ON 3/24/2025] lisdexamfetamine (VYVANSE) 30 MG capsule, Take 1 capsule (30 mg total) by mouth once daily., Disp: 30 capsule, Rfl: 0    Review of Systems    Objective:   /68   Pulse 70   Ht 5' 9" (1.753 m)   Wt 80.7 kg (177 lb 14.6 oz)   SpO2 98%   BMI 26.27 kg/m²      Physical Exam    Physical Exam    Vitals: Weight: 177 lbs.  General: No acute distress. Well-developed. Well-nourished.  Eyes: EOMI. Sclerae anicteric.  HENT: Normocephalic. Atraumatic. Nares patent. Moist oral mucosa.  Ears: Bilateral TMs clear. Bilateral EACs clear.  Cardiovascular: Regular rate. Regular rhythm. No murmurs. No rubs. No gallops. Normal S1, S2.  Respiratory: Normal respiratory effort. Clear to auscultation bilaterally. No rales. No rhonchi. No wheezing.  Abdomen: Soft. Non-tender. Non-distended. Normoactive bowel sounds.  Musculoskeletal: No  obvious deformity.  Extremities: No lower extremity edema.  Neurological: Alert & oriented x3. No slurred speech. Normal gait.  Psychiatric: Normal mood. Normal affect. Good insight. Good judgment.  Skin: Warm. Dry. No rash.         @resultssec@  Assessment & Plan   Assessment & Plan    IMPRESSION:  - Will perform labs, including cholesterol check, metabolic panel, and 1-time hepatitis C and HIV screening  - Noted patient's cholesterol was last drawn in 2021 and was good  - Considered patient's weight stability at 177 lbs  - Offered flu vaccine, which patient declined    PATIENT REFUSAL OF VACCINATION:  - Offered the patient a flu vaccine, which was refused due to concerns about a sore arm.  - Acknowledged the patient's refusal of the immunization.    CERUMEN IMPACTION:  - Observed some cerumen in the patient's ears during exam.  - Jamshid reports frequent digging at ears, potentially causing irritation.  - Educated the patient on D-BROX ear drops as an alternative to cotton swabs for cerumen removal.  - Explained that ear canals are anatomically designed to naturally " expel cerumen, but cotton swabs can desiccate the ear canal.  - Advised against using cotton swabs, paper clips, or other objects to remove cerumen.  - Recommend OTC D-BROX ear drops for cerumen management.  - Instructed the patient on proper ear canal care and function.    LABS:  - Ordered cholesterol panel, metabolic panel, hepatitis C screening, and HIV screening.  - Follow up on 2nd floor by [facility] for labs.         Problem List Items Addressed This Visit    None  Visit Diagnoses       Routine health maintenance    -  Primary    Relevant Orders    Hepatitis C Antibody (Completed)    Comprehensive Metabolic Panel (Completed)    HIV 1/2 Ag/Ab (4th Gen) (Completed)    CBC Without Differential (Completed)    Lipid Panel (Completed)              Immunizations Administered on Date of Encounter - 1/15/2025       No immunizations on file.             No follow-ups on file.    This note was generated with the assistance of ambient listening technology. Verbal consent was obtained by the patient and accompanying visitor(s) for the recording of patient appointment to facilitate this note. I attest to having reviewed and edited the generated note for accuracy, though some syntax or spelling errors may persist. Please contact the author of this note for any clarification.      Disclaimer:  This note may have been prepared using voice recognition software, it may have not been extensively proofed, as such there could be errors within the text such as sound alike errors.

## 2025-03-26 DIAGNOSIS — F90.0 ADHD, PREDOMINANTLY INATTENTIVE TYPE: ICD-10-CM

## 2025-03-26 RX ORDER — LISDEXAMFETAMINE DIMESYLATE 30 MG/1
30 CAPSULE ORAL DAILY
Qty: 30 CAPSULE | Refills: 0 | Status: SHIPPED | OUTPATIENT
Start: 2025-03-26 | End: 2025-04-25

## 2025-03-26 NOTE — TELEPHONE ENCOUNTER
No care due was identified.  Health [facility] Embedded Care Due Messages. Reference number: [id number].   3/26/2025 11:59:42 AM CDT

## 2025-03-26 NOTE — TELEPHONE ENCOUNTER
Refill Encounter  Allergies: Confirmed    PCP Visits: Recent Visits  Date Type Provider Dept   01/15/25 Office Visit Weeks, Misael SHOEMAKER DO [facility] Internal Medicine   Showing recent visits within past 360 days and meeting all other requirements  Future Appointments  No visits were found meeting these conditions.  Showing future appointments within next 720 days and meeting all other requirements     Last 3 Blood Pressure:   BP Readings from Last 3 Encounters:   01/15/25 120/68   02/22/24 125/71   11/14/23 135/77     Preferred Pharmacy:   [id number] - [place] - [address] AT [facility] [address]  [address] [address]  Phone: [phone] Fax: [phone]    Requested RX:  Requested Prescriptions     Pending Prescriptions Disp Refills    lisdexamfetamine (VYVANSE) 30 MG capsule 30 capsule 0     Sig: Take 1 capsule (30 mg total) by mouth once daily.      RX Route: Normal

## 2025-04-26 DIAGNOSIS — F90.0 ADHD, PREDOMINANTLY INATTENTIVE TYPE: ICD-10-CM

## 2025-04-26 NOTE — TELEPHONE ENCOUNTER
No care due was identified.  [facility] Embedded Care Due Messages. Reference number: [id number].   4/26/2025 11:22:35 AM CDT

## 2025-04-28 NOTE — TELEPHONE ENCOUNTER
Refill Encounter    PCP Visits: Recent Visits  Date Type Provider Dept   01/15/25 Office Visit Misael Padilla DO [facility] Internal Medicine   Showing recent visits within past 360 days and meeting all other requirements  Future Appointments  No visits were found meeting these conditions.  Showing future appointments within next 720 days and meeting all other requirements      Last 3 Blood Pressure:   BP Readings from Last 3 Encounters:   01/15/25 120/68   02/22/24 125/71   11/14/23 135/77     Preferred Pharmacy:   [id number] - [place] - [address] AT [facility] [address]  [address] [address]  Phone: [phone] Fax: [phone]    Requested RX:  Requested Prescriptions     Pending Prescriptions Disp Refills    lisdexamfetamine (VYVANSE) 30 MG capsule 30 capsule 0     Sig: Take 1 capsule (30 mg total) by mouth once daily.      RX Route: Normal

## 2025-04-29 RX ORDER — LISDEXAMFETAMINE DIMESYLATE 30 MG/1
30 CAPSULE ORAL DAILY
Qty: 30 CAPSULE | Refills: 0 | Status: SHIPPED | OUTPATIENT
Start: 2025-04-29 | End: 2025-05-29

## 2025-05-29 DIAGNOSIS — F90.0 ADHD, PREDOMINANTLY INATTENTIVE TYPE: ICD-10-CM

## 2025-05-29 RX ORDER — LISDEXAMFETAMINE DIMESYLATE 30 MG/1
30 CAPSULE ORAL DAILY
Qty: 30 CAPSULE | Refills: 0 | Status: SHIPPED | OUTPATIENT
Start: 2025-05-29 | End: 2025-06-28

## 2025-05-29 NOTE — TELEPHONE ENCOUNTER
No care due was identified.  [facility] Embedded Care Due Messages. Reference number: [id number].   5/29/2025 10:09:27 AM CDT

## 2025-05-29 NOTE — TELEPHONE ENCOUNTER
.Refill Encounter    PCP Visits: Recent Visits  Date Type Provider Dept   01/15/25 Office Visit Randy, Misael SHOEMAKER DO [facility] Internal Medicine   Showing recent visits within past 360 days and meeting all other requirements  Future Appointments  No visits were found meeting these conditions.  Showing future appointments within next 720 days and meeting all other requirements      Last 3 Blood Pressure:   BP Readings from Last 3 Encounters:   01/15/25 120/68   02/22/24 125/71   11/14/23 135/77     Preferred Pharmacy:   [id number] - [place] - [address] AT [facility] [place]  [address] [address]  Phone: [phone] Fax: [phone]    Requested RX:  Requested Prescriptions     Pending Prescriptions Disp Refills    lisdexamfetamine (VYVANSE) 30 MG capsule 30 capsule 0     Sig: Take 1 capsule (30 mg total) by mouth once daily.      RX Route: Normal

## 2025-06-30 DIAGNOSIS — F90.0 ADHD, PREDOMINANTLY INATTENTIVE TYPE: ICD-10-CM

## 2025-06-30 NOTE — TELEPHONE ENCOUNTER
No care due was identified.  [facility] Embedded Care Due Messages. Reference number: [id number].   6/30/2025 9:33:45 AM CDT

## 2025-06-30 NOTE — TELEPHONE ENCOUNTER
Refill Encounter    PCP Visits: Recent Visits  Date Type Provider Dept   01/15/25 Office Visit Misael Padilla DO [facility] Internal Medicine   Showing recent visits within past 360 days and meeting all other requirements  Future Appointments  No visits were found meeting these conditions.  Showing future appointments within next 720 days and meeting all other requirements      Last 3 Blood Pressure:   BP Readings from Last 3 Encounters:   01/15/25 120/68   02/22/24 125/71   11/14/23 135/77     Preferred Pharmacy:   [facility] #[id number] - [place] - [address] AT [facility] [address]  [address] [address]  Phone: [phone] Fax: [phone]    Requested RX:  Requested Prescriptions     Pending Prescriptions Disp Refills    lisdexamfetamine (VYVANSE) 30 MG capsule 30 capsule 0     Sig: Take 1 capsule (30 mg total) by mouth once daily.      RX Route: Normal

## 2025-07-01 RX ORDER — LISDEXAMFETAMINE DIMESYLATE 30 MG/1
30 CAPSULE ORAL DAILY
Qty: 30 CAPSULE | Refills: 0 | Status: SHIPPED | OUTPATIENT
Start: 2025-07-01 | End: 2025-07-31

## 2025-07-30 DIAGNOSIS — F90.0 ADHD, PREDOMINANTLY INATTENTIVE TYPE: ICD-10-CM

## 2025-07-30 RX ORDER — LISDEXAMFETAMINE DIMESYLATE 30 MG/1
30 CAPSULE ORAL DAILY
Qty: 30 CAPSULE | Refills: 0 | Status: CANCELLED | OUTPATIENT
Start: 2025-07-30 | End: 2025-08-29

## 2025-07-30 NOTE — TELEPHONE ENCOUNTER
due for your 3 month follow up for ADHD with Misael Padilla DO., or his care team, prior to refill.

## 2025-07-30 NOTE — TELEPHONE ENCOUNTER
No care due was identified.  [facility] Embedded Care Due Messages. Reference number: [id number].   7/30/2025 7:00:14 AM CDT

## 2025-07-30 NOTE — TELEPHONE ENCOUNTER
Refill Encounter  Allergies: Confirmed    PCP Visits:   Recent Visits  Date Type Provider Dept   01/15/25 Office Visit Weeks, Misael SHOEMAKER DO [facility] Internal Medicine   Showing recent visits within past 360 days and meeting all other requirements  Future Appointments  No visits were found meeting these conditions.  Showing future appointments within next 720 days and meeting all other requirements     Last 3 Blood Pressure:   BP Readings from Last 3 Encounters:   01/15/25 120/68   02/22/24 125/71   11/14/23 135/77     Preferred Pharmacy:   [facility] #[id number] - [place] - [address] AT [facility] [address]  [address] [address]  Phone: [phone] Fax: [phone]    Requested RX:  Requested Prescriptions     Pending Prescriptions Disp Refills    lisdexamfetamine (VYVANSE) 30 MG capsule 30 capsule 0     Sig: Take 1 capsule (30 mg total) by mouth once daily.      RX Route: Normal

## 2025-08-05 ENCOUNTER — OFFICE VISIT (OUTPATIENT)
Dept: INTERNAL MEDICINE | Facility: CLINIC | Age: 35
End: 2025-08-05
Payer: COMMERCIAL

## 2025-08-05 VITALS
WEIGHT: 177.94 LBS | BODY MASS INDEX: 26.35 KG/M2 | SYSTOLIC BLOOD PRESSURE: 120 MMHG | OXYGEN SATURATION: 98 % | DIASTOLIC BLOOD PRESSURE: 68 MMHG | HEART RATE: 70 BPM | HEIGHT: 69 IN

## 2025-08-05 DIAGNOSIS — F90.0 ADHD, PREDOMINANTLY INATTENTIVE TYPE: ICD-10-CM

## 2025-08-05 PROCEDURE — 3008F BODY MASS INDEX DOCD: CPT | Mod: CPTII,95,,

## 2025-08-05 PROCEDURE — 1159F MED LIST DOCD IN RCRD: CPT | Mod: CPTII,95,,

## 2025-08-05 PROCEDURE — 98005 SYNCH AUDIO-VIDEO EST LOW 20: CPT | Mod: 95,,,

## 2025-08-05 PROCEDURE — 3078F DIAST BP <80 MM HG: CPT | Mod: CPTII,95,,

## 2025-08-05 PROCEDURE — 3074F SYST BP LT 130 MM HG: CPT | Mod: CPTII,95,,

## 2025-08-05 PROCEDURE — 1160F RVW MEDS BY RX/DR IN RCRD: CPT | Mod: CPTII,95,,

## 2025-08-05 RX ORDER — LISDEXAMFETAMINE DIMESYLATE 30 MG/1
30 CAPSULE ORAL DAILY
Qty: 30 CAPSULE | Refills: 0 | Status: SHIPPED | OUTPATIENT
Start: 2025-08-05 | End: 2025-09-04